# Patient Record
Sex: FEMALE | Race: WHITE | NOT HISPANIC OR LATINO | Employment: FULL TIME | URBAN - NONMETROPOLITAN AREA
[De-identification: names, ages, dates, MRNs, and addresses within clinical notes are randomized per-mention and may not be internally consistent; named-entity substitution may affect disease eponyms.]

---

## 2021-07-21 ENCOUNTER — OFFICE VISIT (OUTPATIENT)
Dept: URGENT CARE | Facility: PHYSICIAN GROUP | Age: 46
End: 2021-07-21
Payer: MEDICAID

## 2021-07-21 VITALS
DIASTOLIC BLOOD PRESSURE: 82 MMHG | BODY MASS INDEX: 22.15 KG/M2 | TEMPERATURE: 98.9 F | OXYGEN SATURATION: 98 % | HEART RATE: 84 BPM | SYSTOLIC BLOOD PRESSURE: 138 MMHG | WEIGHT: 125 LBS | HEIGHT: 63 IN | RESPIRATION RATE: 16 BRPM

## 2021-07-21 DIAGNOSIS — R07.9 CHEST PAIN, UNSPECIFIED TYPE: ICD-10-CM

## 2021-07-21 DIAGNOSIS — M54.12 CERVICAL RADICULOPATHY: ICD-10-CM

## 2021-07-21 PROCEDURE — 99204 OFFICE O/P NEW MOD 45 MIN: CPT | Performed by: PHYSICIAN ASSISTANT

## 2021-07-21 RX ORDER — METHYLPREDNISOLONE 4 MG/1
4 TABLET ORAL SEE ADMIN INSTRUCTIONS
Qty: 21 TABLET | Refills: 0 | Status: SHIPPED | OUTPATIENT
Start: 2021-07-21 | End: 2023-03-08

## 2021-07-21 RX ORDER — CLINDAMYCIN HYDROCHLORIDE 150 MG/1
CAPSULE ORAL
COMMUNITY
Start: 2021-07-12 | End: 2023-03-08

## 2021-07-21 NOTE — PROGRESS NOTES
"Chief Complaint   Patient presents with   • Arm Pain     x LT arm pain, chest pain        HISTORY OF PRESENT ILLNESS: Patient is a 46 y.o. female who presents today because she has a several week history of left arm numbness and tingling, also tingling and pain to the left side of her neck.  She sometimes has pain that radiates across her chest from left to right.  She does have a significant history of cervical degenerative disc disease as well as lumbar degenerative disc disease.  She has been taking Tylenol and ibuprofen without improvement.    There are no problems to display for this patient.      Allergies:Patient has no known allergies.    Current Outpatient Medications Ordered in Epic   Medication Sig Dispense Refill   • clindamycin (CLEOCIN) 150 MG Cap      • methylPREDNISolone (MEDROL DOSEPAK) 4 MG Tablet Therapy Pack Take 1 tablet by mouth see administration instructions. 21 tablet 0     No current Epic-ordered facility-administered medications on file.       History reviewed. No pertinent past medical history.    Social History     Tobacco Use   • Smoking status: Not on file   Substance Use Topics   • Alcohol use: Not on file   • Drug use: Not on file       No family status information on file.   History reviewed. No pertinent family history.    ROS:  Review of Systems   Constitutional: Negative for fever, chills, weight loss and malaise/fatigue.   HENT: Negative for ear pain, nosebleeds, congestion, sore throat and positive for neck pain.    Eyes: Negative for blurred vision.   Respiratory: Negative for cough, sputum production, shortness of breath and wheezing.    Cardiovascular: Positive for vague chest pain, no palpitations, orthopnea and leg swelling.   Gastrointestinal: Negative for heartburn, nausea, vomiting and abdominal pain.   Genitourinary: Negative for dysuria, urgency and frequency.     Exam:  /82   Pulse 84   Temp 37.2 °C (98.9 °F) (Temporal)   Resp 16   Ht 1.6 m (5' 3\")   Wt " 56.7 kg (125 lb)   SpO2 98%   General:  Well nourished, well developed female in NAD  Head:Normocephalic, atraumatic  Eyes: PERRLA, EOM within normal limits, no conjunctival injection, no scleral icterus, visual fields and acuity grossly intact.  Nose: Symmetrical without tenderness, no discharge.  Mouth: reasonable hygiene, no erythema exudates or tonsillar enlargement.  Neck: no masses, range of motion within normal limits, no tracheal deviation. No obvious thyroid enlargement.  Pulmonary: chest is symmetrical with respiration, no wheezes, crackles, or rhonchi.  Cardiovascular: regular rate and rhythm without murmurs, rubs, or gallops.  Extremities: no clubbing, cyanosis, or edema.    EKG by my interpretation in the office shows a normal sinus rhythm with a rate of 72.  There is no ST elevation, depression, no ectopy, no signs of ischemia or infarct.    Please note that this dictation was created using voice recognition software. I have made every reasonable attempt to correct obvious errors, but I expect that there are errors of grammar and possibly content that I did not discover before finalizing the note.    Assessment/Plan:  1. Chest pain, unspecified type  EKG   2. Cervical radiculopathy  methylPREDNISolone (MEDROL DOSEPAK) 4 MG Tablet Therapy Pack       Followup with primary care in the next 7-10 days if not significantly improving, return to the urgent care or go to the emergency room sooner for any worsening of symptoms.

## 2022-01-27 ENCOUNTER — HOSPITAL ENCOUNTER (OUTPATIENT)
Facility: MEDICAL CENTER | Age: 47
End: 2022-01-27
Attending: NURSE PRACTITIONER
Payer: COMMERCIAL

## 2022-01-27 ENCOUNTER — OFFICE VISIT (OUTPATIENT)
Dept: URGENT CARE | Facility: PHYSICIAN GROUP | Age: 47
End: 2022-01-27
Payer: COMMERCIAL

## 2022-01-27 VITALS
BODY MASS INDEX: 21.71 KG/M2 | HEIGHT: 62 IN | HEART RATE: 74 BPM | SYSTOLIC BLOOD PRESSURE: 110 MMHG | OXYGEN SATURATION: 99 % | WEIGHT: 118 LBS | RESPIRATION RATE: 12 BRPM | TEMPERATURE: 97.6 F | DIASTOLIC BLOOD PRESSURE: 68 MMHG

## 2022-01-27 DIAGNOSIS — J06.9 URI WITH COUGH AND CONGESTION: ICD-10-CM

## 2022-01-27 PROCEDURE — U0003 INFECTIOUS AGENT DETECTION BY NUCLEIC ACID (DNA OR RNA); SEVERE ACUTE RESPIRATORY SYNDROME CORONAVIRUS 2 (SARS-COV-2) (CORONAVIRUS DISEASE [COVID-19]), AMPLIFIED PROBE TECHNIQUE, MAKING USE OF HIGH THROUGHPUT TECHNOLOGIES AS DESCRIBED BY CMS-2020-01-R: HCPCS

## 2022-01-27 PROCEDURE — 99213 OFFICE O/P EST LOW 20 MIN: CPT | Performed by: NURSE PRACTITIONER

## 2022-01-27 PROCEDURE — U0005 INFEC AGEN DETEC AMPLI PROBE: HCPCS

## 2022-01-27 RX ORDER — GABAPENTIN 300 MG/1
300 CAPSULE ORAL
COMMUNITY
Start: 2022-01-25

## 2022-01-27 RX ORDER — METAXALONE 800 MG/1
800 TABLET ORAL 3 TIMES DAILY
COMMUNITY
Start: 2022-01-25 | End: 2023-09-13

## 2022-01-27 RX ORDER — ESCITALOPRAM OXALATE 20 MG/1
20 TABLET ORAL DAILY
COMMUNITY
Start: 2022-01-13

## 2022-01-27 ASSESSMENT — ENCOUNTER SYMPTOMS
SORE THROAT: 1
WHEEZING: 0
MYALGIAS: 1
FEVER: 1
CHILLS: 1
SHORTNESS OF BREATH: 0
HEMOPTYSIS: 0
SPUTUM PRODUCTION: 0
ORTHOPNEA: 0
PALPITATIONS: 0
COUGH: 1
SINUS PAIN: 0

## 2022-01-27 NOTE — LETTER
January 27, 2022         Patient: Nesha Mart   YOB: 1975   Date of Visit: 1/27/2022           A concern for Covid-19 has been identified and testing is in progress.  We are asking you practice self-isolation protocol per Center for Disease Control (CDC) guidelines.  You will be able to access test results through our electronic delivery system called [employer].    If the results of the testing are negative, and once there has been no fever (temperature >100.4 F) for at least 72 hours without treatment, and no vomiting or diarrhea for at least 48 hours, then return to work is approved.     If the results of testing are positive then your employee will be contacted by the [facility] or [facility] department for further instructions on duration of self-isolation and return to work or school protocol.  In general, this will also follow the CDC guidelines with a minimum of 5 days from the onset of symptoms and without fever, vomiting, or diarrhea as above.    In general, repeat testing is not necessary and not offered through our [facility].    This is the only note that will be provided from [facility] for this visit.  Your employee will require an appointment with a primary care provider if FMLA or disability forms are required.             Sincerely,           THIEN Blake.  Electronically Signed

## 2022-01-28 DIAGNOSIS — J06.9 URI WITH COUGH AND CONGESTION: ICD-10-CM

## 2022-01-28 LAB
COVID ORDER STATUS COVID19: NORMAL
SARS-COV-2 RNA RESP QL NAA+PROBE: NOTDETECTED
SPECIMEN SOURCE: NORMAL

## 2022-01-28 NOTE — PROGRESS NOTES
"Subjective     Nesha Mart is a 46 y.o. female who presents with Fever (sore throat,  x3 days), Congestion (work note needed), and Body Aches            Nesha comes in today with a 3 day history of fever, chills, myalgias, nasal congestion, sore throat and cough.  No known exposure to covid.        Review of Systems   Constitutional: Positive for chills, fever and malaise/fatigue.   HENT: Positive for congestion and sore throat. Negative for ear pain and sinus pain.    Respiratory: Positive for cough. Negative for hemoptysis, sputum production, shortness of breath and wheezing.    Cardiovascular: Negative for chest pain, palpitations, orthopnea and leg swelling.   Musculoskeletal: Positive for myalgias.     Medications, Allergies, and current problem list reviewed today in Epic         Objective     Blood Pressure 110/68   Pulse 74   Temperature 36.4 °C (97.6 °F)   Respiration 12   Height 1.575 m (5' 2\")   Weight 53.5 kg (118 lb)   Oxygen Saturation 99%   Body Mass Index 21.58 kg/m²      Physical Exam  Vitals reviewed.   Constitutional:       General: She is not in acute distress.     Appearance: Normal appearance. She is well-developed. She is not ill-appearing, toxic-appearing or diaphoretic.      Comments: Nesha appears fatigued.     HENT:      Head: Normocephalic.      Right Ear: Tympanic membrane, ear canal and external ear normal. There is no impacted cerumen.      Left Ear: Tympanic membrane, ear canal and external ear normal. There is no impacted cerumen.      Nose: Congestion and rhinorrhea present.      Mouth/Throat:      Mouth: Mucous membranes are moist.      Pharynx: Posterior oropharyngeal erythema present. No oropharyngeal exudate.   Eyes:      General: No scleral icterus.        Right eye: No discharge.         Left eye: No discharge.      Conjunctiva/sclera: Conjunctivae normal.      Pupils: Pupils are equal, round, and reactive to light.   Neck:      Thyroid: No thyromegaly.      Vascular: " No JVD.      Trachea: No tracheal deviation.   Cardiovascular:      Rate and Rhythm: Normal rate and regular rhythm.      Heart sounds: Normal heart sounds. No murmur heard.  No friction rub. No gallop.    Pulmonary:      Effort: Pulmonary effort is normal. No respiratory distress.      Breath sounds: Normal breath sounds. No stridor. No wheezing, rhonchi or rales.   Chest:      Chest wall: No tenderness.   Musculoskeletal:      Cervical back: Neck supple.   Lymphadenopathy:      Cervical: No cervical adenopathy.   Skin:     General: Skin is warm and dry.      Findings: No erythema or rash.   Neurological:      Mental Status: She is alert and oriented to person, place, and time.   Psychiatric:         Mood and Affect: Mood normal.                             Assessment & Plan        1. URI with cough and congestion  - SARS-CoV-2, PCR (In-House); Future    Advised Nesha that based on the history and exam findings, this is likely a self-limiting viral illness.  There is no indication for antibiotics at this time.  Differential reviewed.  At home isolation recommended pending covid test results.  OTC cold medications prn symptom management.  OTC NSAIDs or tylenol prn fever, pain.  Maintain adequate po hydration.  RTC in 5-7 days if symptoms persist, sooner if worse.  ED precautions reviewed.  She verbalized understanding of and agreed with plan of care.

## 2022-09-20 ENCOUNTER — HOSPITAL ENCOUNTER (OUTPATIENT)
Dept: LAB | Facility: MEDICAL CENTER | Age: 47
End: 2022-09-20
Attending: FAMILY MEDICINE
Payer: COMMERCIAL

## 2022-09-20 LAB
25(OH)D3 SERPL-MCNC: 38 NG/ML (ref 30–100)
ALBUMIN SERPL BCP-MCNC: 4.3 G/DL (ref 3.2–4.9)
ALBUMIN/GLOB SERPL: 1.5 G/DL
ALP SERPL-CCNC: 55 U/L (ref 30–99)
ALT SERPL-CCNC: 33 U/L (ref 2–50)
ANION GAP SERPL CALC-SCNC: 8 MMOL/L (ref 7–16)
AST SERPL-CCNC: 30 U/L (ref 12–45)
BILIRUB SERPL-MCNC: 0.2 MG/DL (ref 0.1–1.5)
BUN SERPL-MCNC: 21 MG/DL (ref 8–22)
CALCIUM SERPL-MCNC: 9.6 MG/DL (ref 8.5–10.5)
CHLORIDE SERPL-SCNC: 104 MMOL/L (ref 96–112)
CHOLEST SERPL-MCNC: 157 MG/DL (ref 100–199)
CO2 SERPL-SCNC: 28 MMOL/L (ref 20–33)
CREAT SERPL-MCNC: 0.58 MG/DL (ref 0.5–1.4)
ERYTHROCYTE [DISTWIDTH] IN BLOOD BY AUTOMATED COUNT: 39.9 FL (ref 35.9–50)
FASTING STATUS PATIENT QL REPORTED: NORMAL
GFR SERPLBLD CREATININE-BSD FMLA CKD-EPI: 112 ML/MIN/1.73 M 2
GLOBULIN SER CALC-MCNC: 2.9 G/DL (ref 1.9–3.5)
GLUCOSE SERPL-MCNC: 100 MG/DL (ref 65–99)
HCT VFR BLD AUTO: 39.2 % (ref 37–47)
HDLC SERPL-MCNC: 49 MG/DL
HGB BLD-MCNC: 13.2 G/DL (ref 12–16)
LDLC SERPL CALC-MCNC: 93 MG/DL
MCH RBC QN AUTO: 31.1 PG (ref 27–33)
MCHC RBC AUTO-ENTMCNC: 33.7 G/DL (ref 33.6–35)
MCV RBC AUTO: 92.2 FL (ref 81.4–97.8)
PLATELET # BLD AUTO: 357 K/UL (ref 164–446)
PMV BLD AUTO: 9.6 FL (ref 9–12.9)
POTASSIUM SERPL-SCNC: 4.6 MMOL/L (ref 3.6–5.5)
PROT SERPL-MCNC: 7.2 G/DL (ref 6–8.2)
RBC # BLD AUTO: 4.25 M/UL (ref 4.2–5.4)
SODIUM SERPL-SCNC: 140 MMOL/L (ref 135–145)
T4 FREE SERPL-MCNC: 1.53 NG/DL (ref 0.93–1.7)
TRIGL SERPL-MCNC: 76 MG/DL (ref 0–149)
TSH SERPL DL<=0.005 MIU/L-ACNC: <0.005 UIU/ML (ref 0.38–5.33)
VIT B12 SERPL-MCNC: 824 PG/ML (ref 211–911)
WBC # BLD AUTO: 5.1 K/UL (ref 4.8–10.8)

## 2022-09-20 PROCEDURE — 36415 COLL VENOUS BLD VENIPUNCTURE: CPT

## 2022-09-20 PROCEDURE — 82607 VITAMIN B-12: CPT

## 2022-09-20 PROCEDURE — 85027 COMPLETE CBC AUTOMATED: CPT

## 2022-09-20 PROCEDURE — 84439 ASSAY OF FREE THYROXINE: CPT

## 2022-09-20 PROCEDURE — 84443 ASSAY THYROID STIM HORMONE: CPT

## 2022-09-20 PROCEDURE — 82306 VITAMIN D 25 HYDROXY: CPT

## 2022-09-20 PROCEDURE — 83735 ASSAY OF MAGNESIUM: CPT

## 2022-09-20 PROCEDURE — 80061 LIPID PANEL: CPT

## 2022-09-20 PROCEDURE — 80053 COMPREHEN METABOLIC PANEL: CPT

## 2022-09-24 LAB — MAGNESIUM RBC-SCNC: 6.4 MG/DL (ref 3.6–7.5)

## 2023-01-04 ENCOUNTER — HOSPITAL ENCOUNTER (OUTPATIENT)
Dept: LAB | Facility: MEDICAL CENTER | Age: 48
End: 2023-01-04
Attending: OBSTETRICS & GYNECOLOGY
Payer: COMMERCIAL

## 2023-01-04 LAB
25(OH)D3 SERPL-MCNC: 37 NG/ML (ref 30–100)
ANION GAP SERPL CALC-SCNC: 8 MMOL/L (ref 7–16)
BUN SERPL-MCNC: 18 MG/DL (ref 8–22)
CALCIUM SERPL-MCNC: 9.4 MG/DL (ref 8.5–10.5)
CHLORIDE SERPL-SCNC: 107 MMOL/L (ref 96–112)
CO2 SERPL-SCNC: 25 MMOL/L (ref 20–33)
CREAT SERPL-MCNC: 0.54 MG/DL (ref 0.5–1.4)
EST. AVERAGE GLUCOSE BLD GHB EST-MCNC: 105 MG/DL
GFR SERPLBLD CREATININE-BSD FMLA CKD-EPI: 114 ML/MIN/1.73 M 2
GLUCOSE SERPL-MCNC: 92 MG/DL (ref 65–99)
HBA1C MFR BLD: 5.3 % (ref 4–5.6)
POTASSIUM SERPL-SCNC: 4.2 MMOL/L (ref 3.6–5.5)
SODIUM SERPL-SCNC: 140 MMOL/L (ref 135–145)
T3FREE SERPL-MCNC: 12.3 PG/ML (ref 2–4.4)
T4 FREE SERPL-MCNC: 2.83 NG/DL (ref 0.93–1.7)
THYROPEROXIDASE AB SERPL-ACNC: >600 IU/ML (ref 0–9)
TSH SERPL DL<=0.005 MIU/L-ACNC: <0.005 UIU/ML (ref 0.38–5.33)

## 2023-01-04 PROCEDURE — 83525 ASSAY OF INSULIN: CPT

## 2023-01-04 PROCEDURE — 86800 THYROGLOBULIN ANTIBODY: CPT

## 2023-01-04 PROCEDURE — 84481 FREE ASSAY (FT-3): CPT

## 2023-01-04 PROCEDURE — 82306 VITAMIN D 25 HYDROXY: CPT

## 2023-01-04 PROCEDURE — 84439 ASSAY OF FREE THYROXINE: CPT

## 2023-01-04 PROCEDURE — 80048 BASIC METABOLIC PNL TOTAL CA: CPT

## 2023-01-04 PROCEDURE — 86376 MICROSOMAL ANTIBODY EACH: CPT

## 2023-01-04 PROCEDURE — 36415 COLL VENOUS BLD VENIPUNCTURE: CPT

## 2023-01-04 PROCEDURE — 83036 HEMOGLOBIN GLYCOSYLATED A1C: CPT

## 2023-01-04 PROCEDURE — 84443 ASSAY THYROID STIM HORMONE: CPT

## 2023-01-07 LAB
INSULIN P FAST SERPL-ACNC: 6 UIU/ML (ref 3–25)
THYROGLOB AB SERPL-ACNC: 1.5 IU/ML (ref 0–4)

## 2023-03-08 ENCOUNTER — OFFICE VISIT (OUTPATIENT)
Dept: URGENT CARE | Facility: PHYSICIAN GROUP | Age: 48
End: 2023-03-08
Payer: COMMERCIAL

## 2023-03-08 VITALS
DIASTOLIC BLOOD PRESSURE: 78 MMHG | BODY MASS INDEX: 21.62 KG/M2 | WEIGHT: 122 LBS | HEIGHT: 63 IN | HEART RATE: 168 BPM | RESPIRATION RATE: 16 BRPM | TEMPERATURE: 98.5 F | OXYGEN SATURATION: 97 % | SYSTOLIC BLOOD PRESSURE: 118 MMHG

## 2023-03-08 DIAGNOSIS — R00.0 TACHYCARDIA: ICD-10-CM

## 2023-03-08 DIAGNOSIS — R11.2 NAUSEA AND VOMITING, UNSPECIFIED VOMITING TYPE: ICD-10-CM

## 2023-03-08 DIAGNOSIS — T50.905A MEDICATION REACTION, INITIAL ENCOUNTER: ICD-10-CM

## 2023-03-08 PROCEDURE — 93000 ELECTROCARDIOGRAM COMPLETE: CPT | Performed by: FAMILY MEDICINE

## 2023-03-08 PROCEDURE — 99214 OFFICE O/P EST MOD 30 MIN: CPT | Performed by: FAMILY MEDICINE

## 2023-03-08 RX ORDER — ONDANSETRON 4 MG/1
4 TABLET, ORALLY DISINTEGRATING ORAL ONCE
Status: COMPLETED | OUTPATIENT
Start: 2023-03-08 | End: 2023-03-08

## 2023-03-08 RX ORDER — ONDANSETRON 4 MG/1
4 TABLET, ORALLY DISINTEGRATING ORAL EVERY 8 HOURS PRN
Qty: 6 TABLET | Refills: 0 | Status: SHIPPED | OUTPATIENT
Start: 2023-03-08

## 2023-03-08 RX ORDER — METOPROLOL SUCCINATE 25 MG/1
12.5 TABLET, EXTENDED RELEASE ORAL 2 TIMES DAILY
Status: ON HOLD | COMMUNITY
Start: 2023-01-17 | End: 2023-03-11

## 2023-03-08 RX ORDER — CYCLOBENZAPRINE HCL 5 MG
5 TABLET ORAL 3 TIMES DAILY PRN
COMMUNITY
Start: 2023-01-09 | End: 2023-03-08

## 2023-03-08 RX ORDER — CYCLOBENZAPRINE HCL 10 MG
10 TABLET ORAL 3 TIMES DAILY
COMMUNITY
Start: 2023-02-12

## 2023-03-08 RX ORDER — DIPHENHYDRAMINE HCL 25 MG
25 CAPSULE ORAL ONCE
Status: COMPLETED | OUTPATIENT
Start: 2023-03-08 | End: 2023-03-08

## 2023-03-08 RX ORDER — PREGABALIN 50 MG/1
50 CAPSULE ORAL DAILY
Status: ON HOLD | COMMUNITY
Start: 2023-03-07 | End: 2023-03-11

## 2023-03-08 RX ORDER — MELOXICAM 15 MG/1
15 TABLET ORAL DAILY
COMMUNITY
Start: 2023-02-20

## 2023-03-08 RX ADMIN — Medication 25 MG: at 18:57

## 2023-03-08 RX ADMIN — ONDANSETRON 4 MG: 4 TABLET, ORALLY DISINTEGRATING ORAL at 18:50

## 2023-03-08 ASSESSMENT — ENCOUNTER SYMPTOMS
EYE DISCHARGE: 0
EYE REDNESS: 0
WEIGHT LOSS: 0
DIARRHEA: 0
MYALGIAS: 0

## 2023-03-08 ASSESSMENT — FIBROSIS 4 INDEX: FIB4 SCORE: 0.7

## 2023-03-08 NOTE — LETTER
March 8, 2023         Patient: Nesha Mart   YOB: 1975   Date of Visit: 3/8/2023           To Whom it May Concern:    Nesha Mart was seen in my clinic on 3/8/2023. Please excuse from work 3/8 and 3/9/2023.       Sincerely,           Guy Orozco M.D.  Electronically Signed

## 2023-03-09 NOTE — PROGRESS NOTES
"Subjective     Nesha Mart is a 48 y.o. female who presents with Shaking, Emesis, and Tachycardia            Onset this morning 3 episodes of nausea and vomiting.  Thinks due to starting Lyrica.  She is recently stopped her gabapentin.  She takes these medications for pain management.  No blood in emesis.  She feels shaky and somewhat anxious.  Heart feels like it is racing.  No chest pain.  No shortness of breath.  Normal urine output. No other aggravating or alleviating factors.      Review of Systems   Constitutional:  Negative for malaise/fatigue and weight loss.   Eyes:  Negative for discharge and redness.   Cardiovascular:  Negative for leg swelling.   Gastrointestinal:  Negative for diarrhea.   Musculoskeletal:  Negative for joint pain and myalgias.   Skin:  Negative for itching and rash.            Objective     /78   Pulse (!) 168   Temp 36.9 °C (98.5 °F) (Temporal)   Resp 16   Ht 1.6 m (5' 3\")   Wt 55.3 kg (122 lb)   SpO2 97%   BMI 21.61 kg/m²      Physical Exam  Constitutional:       General: She is not in acute distress.     Appearance: She is well-developed.   HENT:      Head: Normocephalic and atraumatic.      Mouth/Throat:      Mouth: Mucous membranes are moist.      Pharynx: No posterior oropharyngeal erythema.   Eyes:      Conjunctiva/sclera: Conjunctivae normal.   Cardiovascular:      Rate and Rhythm: Regular rhythm. Tachycardia present.      Heart sounds: Normal heart sounds. No murmur heard.     Comments: Recheck HR at 19:10 120.   Pulmonary:      Effort: Pulmonary effort is normal.      Breath sounds: Normal breath sounds. No wheezing.   Skin:     General: Skin is warm and dry.      Findings: No rash.   Neurological:      Mental Status: She is alert.                           Assessment & Plan   Ekg: sinus tachycardia 128, baseline wander, nonspecific t wave changes.        1. Nausea and vomiting, unspecified vomiting type  ondansetron (ZOFRAN ODT) dispertab 4 mg    ondansetron " (ZOFRAN ODT) 4 MG TABLET DISPERSIBLE      2. Tachycardia  EKG - Clinic Performed      3. Medication reaction, initial encounter  diphenhydrAMINE (BENADRYL) capsule 25 mg           Differential diagnosis, natural history, supportive care, and indications for immediate follow-up were discussed.     Possible medication reaction. Stop lyrica.      Nesha has a heart rate monitor watch at home. If pulse remains over 100 this evening I recommend to ER for further evaluation.

## 2023-03-10 ENCOUNTER — HOSPITAL ENCOUNTER (OUTPATIENT)
Facility: MEDICAL CENTER | Age: 48
End: 2023-03-11
Attending: EMERGENCY MEDICINE | Admitting: INTERNAL MEDICINE
Payer: COMMERCIAL

## 2023-03-10 ENCOUNTER — APPOINTMENT (OUTPATIENT)
Dept: CARDIOLOGY | Facility: MEDICAL CENTER | Age: 48
End: 2023-03-10
Attending: INTERNAL MEDICINE
Payer: COMMERCIAL

## 2023-03-10 ENCOUNTER — APPOINTMENT (OUTPATIENT)
Dept: RADIOLOGY | Facility: MEDICAL CENTER | Age: 48
End: 2023-03-10
Attending: INTERNAL MEDICINE
Payer: COMMERCIAL

## 2023-03-10 DIAGNOSIS — R00.2 PALPITATIONS: ICD-10-CM

## 2023-03-10 DIAGNOSIS — R00.0 TACHYCARDIA: ICD-10-CM

## 2023-03-10 DIAGNOSIS — E05.90 HYPERTHYROIDISM: ICD-10-CM

## 2023-03-10 PROBLEM — M54.9 CHRONIC BACK PAIN: Status: ACTIVE | Noted: 2023-03-10

## 2023-03-10 PROBLEM — G89.29 CHRONIC BACK PAIN: Status: ACTIVE | Noted: 2023-03-10

## 2023-03-10 LAB
ALBUMIN SERPL BCP-MCNC: 3.8 G/DL (ref 3.2–4.9)
ALBUMIN/GLOB SERPL: 1.1 G/DL
ALP SERPL-CCNC: 98 U/L (ref 30–99)
ALT SERPL-CCNC: 46 U/L (ref 2–50)
AMPHET UR QL SCN: NEGATIVE
ANION GAP SERPL CALC-SCNC: 12 MMOL/L (ref 7–16)
AST SERPL-CCNC: 35 U/L (ref 12–45)
BARBITURATES UR QL SCN: NEGATIVE
BASOPHILS # BLD AUTO: 0.3 % (ref 0–1.8)
BASOPHILS # BLD: 0.02 K/UL (ref 0–0.12)
BENZODIAZ UR QL SCN: NEGATIVE
BILIRUB SERPL-MCNC: 0.2 MG/DL (ref 0.1–1.5)
BUN SERPL-MCNC: 28 MG/DL (ref 8–22)
BZE UR QL SCN: NEGATIVE
CALCIUM ALBUM COR SERPL-MCNC: 10 MG/DL (ref 8.5–10.5)
CALCIUM SERPL-MCNC: 9.8 MG/DL (ref 8.5–10.5)
CANNABINOIDS UR QL SCN: NEGATIVE
CHLORIDE SERPL-SCNC: 104 MMOL/L (ref 96–112)
CO2 SERPL-SCNC: 22 MMOL/L (ref 20–33)
CREAT SERPL-MCNC: 0.69 MG/DL (ref 0.5–1.4)
EKG IMPRESSION: NORMAL
EOSINOPHIL # BLD AUTO: 0.06 K/UL (ref 0–0.51)
EOSINOPHIL NFR BLD: 0.9 % (ref 0–6.9)
ERYTHROCYTE [DISTWIDTH] IN BLOOD BY AUTOMATED COUNT: 37.7 FL (ref 35.9–50)
GFR SERPLBLD CREATININE-BSD FMLA CKD-EPI: 107 ML/MIN/1.73 M 2
GLOBULIN SER CALC-MCNC: 3.6 G/DL (ref 1.9–3.5)
GLUCOSE SERPL-MCNC: 94 MG/DL (ref 65–99)
HCT VFR BLD AUTO: 37 % (ref 37–47)
HGB BLD-MCNC: 12.5 G/DL (ref 12–16)
IMM GRANULOCYTES # BLD AUTO: 0.02 K/UL (ref 0–0.11)
IMM GRANULOCYTES NFR BLD AUTO: 0.3 % (ref 0–0.9)
LV EJECT FRACT  99904: 60
LV EJECT FRACT MOD 2C 99903: 63.68
LV EJECT FRACT MOD 4C 99902: 68.48
LV EJECT FRACT MOD BP 99901: 67.26
LYMPHOCYTES # BLD AUTO: 2.8 K/UL (ref 1–4.8)
LYMPHOCYTES NFR BLD: 41.9 % (ref 22–41)
MAGNESIUM SERPL-MCNC: 1.7 MG/DL (ref 1.5–2.5)
MCH RBC QN AUTO: 29.1 PG (ref 27–33)
MCHC RBC AUTO-ENTMCNC: 33.8 G/DL (ref 33.6–35)
MCV RBC AUTO: 86.2 FL (ref 81.4–97.8)
METHADONE UR QL SCN: NEGATIVE
MONOCYTES # BLD AUTO: 0.53 K/UL (ref 0–0.85)
MONOCYTES NFR BLD AUTO: 7.9 % (ref 0–13.4)
NEUTROPHILS # BLD AUTO: 3.26 K/UL (ref 2–7.15)
NEUTROPHILS NFR BLD: 48.7 % (ref 44–72)
NRBC # BLD AUTO: 0 K/UL
NRBC BLD-RTO: 0 /100 WBC
OPIATES UR QL SCN: NEGATIVE
OXYCODONE UR QL SCN: NEGATIVE
PCP UR QL SCN: NEGATIVE
PLATELET # BLD AUTO: 380 K/UL (ref 164–446)
PMV BLD AUTO: 9.1 FL (ref 9–12.9)
POTASSIUM SERPL-SCNC: 3.8 MMOL/L (ref 3.6–5.5)
PROPOXYPH UR QL SCN: NEGATIVE
PROT SERPL-MCNC: 7.4 G/DL (ref 6–8.2)
RBC # BLD AUTO: 4.29 M/UL (ref 4.2–5.4)
SODIUM SERPL-SCNC: 138 MMOL/L (ref 135–145)
T3 SERPL-MCNC: 375 NG/DL (ref 60–181)
T3FREE SERPL-MCNC: 15.1 PG/ML (ref 2–4.4)
T4 FREE SERPL-MCNC: 5.33 NG/DL (ref 0.93–1.7)
TSH SERPL DL<=0.005 MIU/L-ACNC: <0.005 UIU/ML (ref 0.38–5.33)
WBC # BLD AUTO: 6.7 K/UL (ref 4.8–10.8)

## 2023-03-10 PROCEDURE — G0378 HOSPITAL OBSERVATION PER HR: HCPCS

## 2023-03-10 PROCEDURE — 93005 ELECTROCARDIOGRAM TRACING: CPT

## 2023-03-10 PROCEDURE — 83735 ASSAY OF MAGNESIUM: CPT

## 2023-03-10 PROCEDURE — 80053 COMPREHEN METABOLIC PANEL: CPT

## 2023-03-10 PROCEDURE — 93005 ELECTROCARDIOGRAM TRACING: CPT | Performed by: EMERGENCY MEDICINE

## 2023-03-10 PROCEDURE — 84480 ASSAY TRIIODOTHYRONINE (T3): CPT

## 2023-03-10 PROCEDURE — 84443 ASSAY THYROID STIM HORMONE: CPT

## 2023-03-10 PROCEDURE — 84439 ASSAY OF FREE THYROXINE: CPT

## 2023-03-10 PROCEDURE — 99285 EMERGENCY DEPT VISIT HI MDM: CPT

## 2023-03-10 PROCEDURE — 76536 US EXAM OF HEAD AND NECK: CPT

## 2023-03-10 PROCEDURE — 93306 TTE W/DOPPLER COMPLETE: CPT | Mod: 26 | Performed by: INTERNAL MEDICINE

## 2023-03-10 PROCEDURE — A9270 NON-COVERED ITEM OR SERVICE: HCPCS | Performed by: INTERNAL MEDICINE

## 2023-03-10 PROCEDURE — 700105 HCHG RX REV CODE 258: Performed by: INTERNAL MEDICINE

## 2023-03-10 PROCEDURE — 99222 1ST HOSP IP/OBS MODERATE 55: CPT | Performed by: INTERNAL MEDICINE

## 2023-03-10 PROCEDURE — 700105 HCHG RX REV CODE 258: Performed by: EMERGENCY MEDICINE

## 2023-03-10 PROCEDURE — 700102 HCHG RX REV CODE 250 W/ 637 OVERRIDE(OP): Performed by: INTERNAL MEDICINE

## 2023-03-10 PROCEDURE — 85025 COMPLETE CBC W/AUTO DIFF WBC: CPT

## 2023-03-10 PROCEDURE — 93306 TTE W/DOPPLER COMPLETE: CPT

## 2023-03-10 PROCEDURE — 700111 HCHG RX REV CODE 636 W/ 250 OVERRIDE (IP): Performed by: INTERNAL MEDICINE

## 2023-03-10 PROCEDURE — 96374 THER/PROPH/DIAG INJ IV PUSH: CPT

## 2023-03-10 PROCEDURE — 84481 FREE ASSAY (FT-3): CPT

## 2023-03-10 PROCEDURE — 80307 DRUG TEST PRSMV CHEM ANLYZR: CPT

## 2023-03-10 PROCEDURE — 36415 COLL VENOUS BLD VENIPUNCTURE: CPT

## 2023-03-10 RX ORDER — SODIUM CHLORIDE, SODIUM LACTATE, POTASSIUM CHLORIDE, CALCIUM CHLORIDE 600; 310; 30; 20 MG/100ML; MG/100ML; MG/100ML; MG/100ML
INJECTION, SOLUTION INTRAVENOUS CONTINUOUS
Status: DISCONTINUED | OUTPATIENT
Start: 2023-03-10 | End: 2023-03-11 | Stop reason: HOSPADM

## 2023-03-10 RX ORDER — LABETALOL HYDROCHLORIDE 5 MG/ML
10 INJECTION, SOLUTION INTRAVENOUS EVERY 4 HOURS PRN
Status: DISCONTINUED | OUTPATIENT
Start: 2023-03-10 | End: 2023-03-11 | Stop reason: HOSPADM

## 2023-03-10 RX ORDER — PROMETHAZINE HYDROCHLORIDE 25 MG/1
12.5-25 SUPPOSITORY RECTAL EVERY 4 HOURS PRN
Status: DISCONTINUED | OUTPATIENT
Start: 2023-03-10 | End: 2023-03-11 | Stop reason: HOSPADM

## 2023-03-10 RX ORDER — ESCITALOPRAM OXALATE 10 MG/1
20 TABLET ORAL DAILY
Status: DISCONTINUED | OUTPATIENT
Start: 2023-03-10 | End: 2023-03-11 | Stop reason: HOSPADM

## 2023-03-10 RX ORDER — POLYETHYLENE GLYCOL 3350 17 G/17G
1 POWDER, FOR SOLUTION ORAL
Status: DISCONTINUED | OUTPATIENT
Start: 2023-03-10 | End: 2023-03-11 | Stop reason: HOSPADM

## 2023-03-10 RX ORDER — DOCUSATE SODIUM 100 MG/1
100 CAPSULE, LIQUID FILLED ORAL 2 TIMES DAILY
COMMUNITY
End: 2024-03-11

## 2023-03-10 RX ORDER — SODIUM CHLORIDE 9 MG/ML
1000 INJECTION, SOLUTION INTRAVENOUS ONCE
Status: COMPLETED | OUTPATIENT
Start: 2023-03-10 | End: 2023-03-10

## 2023-03-10 RX ORDER — ONDANSETRON 4 MG/1
4 TABLET, ORALLY DISINTEGRATING ORAL EVERY 4 HOURS PRN
Status: DISCONTINUED | OUTPATIENT
Start: 2023-03-10 | End: 2023-03-11 | Stop reason: HOSPADM

## 2023-03-10 RX ORDER — MELOXICAM 7.5 MG/1
15 TABLET ORAL DAILY
Status: DISCONTINUED | OUTPATIENT
Start: 2023-03-10 | End: 2023-03-11 | Stop reason: HOSPADM

## 2023-03-10 RX ORDER — PROCHLORPERAZINE EDISYLATE 5 MG/ML
5-10 INJECTION INTRAMUSCULAR; INTRAVENOUS EVERY 4 HOURS PRN
Status: DISCONTINUED | OUTPATIENT
Start: 2023-03-10 | End: 2023-03-11 | Stop reason: HOSPADM

## 2023-03-10 RX ORDER — PROMETHAZINE HYDROCHLORIDE 25 MG/1
12.5-25 TABLET ORAL EVERY 4 HOURS PRN
Status: DISCONTINUED | OUTPATIENT
Start: 2023-03-10 | End: 2023-03-11 | Stop reason: HOSPADM

## 2023-03-10 RX ORDER — ATENOLOL 25 MG/1
50 TABLET ORAL
Status: DISCONTINUED | OUTPATIENT
Start: 2023-03-10 | End: 2023-03-11 | Stop reason: HOSPADM

## 2023-03-10 RX ORDER — CYCLOBENZAPRINE HCL 5 MG
10 TABLET ORAL 3 TIMES DAILY
Status: DISCONTINUED | OUTPATIENT
Start: 2023-03-10 | End: 2023-03-11 | Stop reason: HOSPADM

## 2023-03-10 RX ORDER — ONDANSETRON 2 MG/ML
4 INJECTION INTRAMUSCULAR; INTRAVENOUS EVERY 4 HOURS PRN
Status: DISCONTINUED | OUTPATIENT
Start: 2023-03-10 | End: 2023-03-11 | Stop reason: HOSPADM

## 2023-03-10 RX ORDER — BISACODYL 10 MG
10 SUPPOSITORY, RECTAL RECTAL
Status: DISCONTINUED | OUTPATIENT
Start: 2023-03-10 | End: 2023-03-11 | Stop reason: HOSPADM

## 2023-03-10 RX ORDER — AMOXICILLIN 250 MG
2 CAPSULE ORAL 2 TIMES DAILY
Status: DISCONTINUED | OUTPATIENT
Start: 2023-03-10 | End: 2023-03-11 | Stop reason: HOSPADM

## 2023-03-10 RX ORDER — METHIMAZOLE 10 MG/1
10 TABLET ORAL 2 TIMES DAILY
Status: DISCONTINUED | OUTPATIENT
Start: 2023-03-10 | End: 2023-03-11 | Stop reason: HOSPADM

## 2023-03-10 RX ORDER — METAXALONE 800 MG/1
800 TABLET ORAL 3 TIMES DAILY
Status: DISCONTINUED | OUTPATIENT
Start: 2023-03-10 | End: 2023-03-11 | Stop reason: HOSPADM

## 2023-03-10 RX ORDER — ACETAMINOPHEN 325 MG/1
650 TABLET ORAL EVERY 6 HOURS PRN
Status: DISCONTINUED | OUTPATIENT
Start: 2023-03-10 | End: 2023-03-11 | Stop reason: HOSPADM

## 2023-03-10 RX ADMIN — CYCLOBENZAPRINE HYDROCHLORIDE 10 MG: 5 TABLET, FILM COATED ORAL at 20:05

## 2023-03-10 RX ADMIN — SODIUM CHLORIDE 1000 ML: 9 INJECTION, SOLUTION INTRAVENOUS at 09:29

## 2023-03-10 RX ADMIN — ONDANSETRON 4 MG: 2 INJECTION INTRAMUSCULAR; INTRAVENOUS at 23:49

## 2023-03-10 RX ADMIN — METHIMAZOLE 10 MG: 10 TABLET ORAL at 17:54

## 2023-03-10 RX ADMIN — SENNOSIDES AND DOCUSATE SODIUM 2 TABLET: 50; 8.6 TABLET ORAL at 17:54

## 2023-03-10 RX ADMIN — SODIUM CHLORIDE, POTASSIUM CHLORIDE, SODIUM LACTATE AND CALCIUM CHLORIDE: 600; 310; 30; 20 INJECTION, SOLUTION INTRAVENOUS at 20:20

## 2023-03-10 RX ADMIN — METAXALONE 800 MG: 800 TABLET ORAL at 20:06

## 2023-03-10 RX ADMIN — SODIUM CHLORIDE, POTASSIUM CHLORIDE, SODIUM LACTATE AND CALCIUM CHLORIDE: 600; 310; 30; 20 INJECTION, SOLUTION INTRAVENOUS at 12:36

## 2023-03-10 RX ADMIN — ESCITALOPRAM OXALATE 20 MG: 10 TABLET ORAL at 12:34

## 2023-03-10 RX ADMIN — SODIUM CHLORIDE 1000 ML: 9 INJECTION, SOLUTION INTRAVENOUS at 11:18

## 2023-03-10 RX ADMIN — METAXALONE 800 MG: 800 TABLET ORAL at 15:15

## 2023-03-10 RX ADMIN — CHOLECALCIFEROL TAB 125 MCG (5000 UNIT) 5000 UNITS: 125 TAB at 13:49

## 2023-03-10 RX ADMIN — CYCLOBENZAPRINE HYDROCHLORIDE 10 MG: 5 TABLET, FILM COATED ORAL at 15:14

## 2023-03-10 RX ADMIN — ATENOLOL 50 MG: 25 TABLET ORAL at 16:40

## 2023-03-10 RX ADMIN — MELOXICAM 15 MG: 7.5 TABLET ORAL at 13:48

## 2023-03-10 ASSESSMENT — FIBROSIS 4 INDEX
FIB4 SCORE: 0.65
FIB4 SCORE: 0.65
FIB4 SCORE: 0.7

## 2023-03-10 ASSESSMENT — LIFESTYLE VARIABLES
ALCOHOL_USE: NO
EVER FELT BAD OR GUILTY ABOUT YOUR DRINKING: NO
TOTAL SCORE: 0
TOTAL SCORE: 0
EVER HAD A DRINK FIRST THING IN THE MORNING TO STEADY YOUR NERVES TO GET RID OF A HANGOVER: NO
HAVE YOU EVER FELT YOU SHOULD CUT DOWN ON YOUR DRINKING: NO
HOW MANY TIMES IN THE PAST YEAR HAVE YOU HAD 5 OR MORE DRINKS IN A DAY: 0
TOTAL SCORE: 0
ON A TYPICAL DAY WHEN YOU DRINK ALCOHOL HOW MANY DRINKS DO YOU HAVE: 0
AVERAGE NUMBER OF DAYS PER WEEK YOU HAVE A DRINK CONTAINING ALCOHOL: 0
HAVE PEOPLE ANNOYED YOU BY CRITICIZING YOUR DRINKING: NO
DOES PATIENT WANT TO STOP DRINKING: NO
CONSUMPTION TOTAL: NEGATIVE

## 2023-03-10 ASSESSMENT — PATIENT HEALTH QUESTIONNAIRE - PHQ9
SUM OF ALL RESPONSES TO PHQ9 QUESTIONS 1 AND 2: 0
2. FEELING DOWN, DEPRESSED, IRRITABLE, OR HOPELESS: NOT AT ALL
1. LITTLE INTEREST OR PLEASURE IN DOING THINGS: NOT AT ALL

## 2023-03-10 ASSESSMENT — PAIN DESCRIPTION - PAIN TYPE: TYPE: CHRONIC PAIN

## 2023-03-10 NOTE — ASSESSMENT & PLAN NOTE
- Sinus tachycardia.  Suspect this is related to Lyrica intolerance, however this is in setting of baseline hyperthyroidism.  -Lyrica will be stopped.  Hold off on restarting gabapentin, and deferred this outpatient pain management specialist.  -Management of hyperthyroidism as below.  -She may be dry as well, with elevated BUN.  Continue IV fluid rehydration, start LR at 125 cc/h.  -Monitor on telemetry.  Check baseline echocardiogram.

## 2023-03-10 NOTE — ASSESSMENT & PLAN NOTE
- She has been seeing an outpatient pain specialist.  Unfortunately, developed adverse reaction to Lyrica.  Hold off on Lyrica for now, and also hold off on gabapentin with deference to outpatient pain management to start alternative medications.  Resume home Flexeril, Lexapro, Mobic and Skelaxin.

## 2023-03-10 NOTE — ED PROVIDER NOTES
ER Provider Note    Scribed for John Lombardo M.d. by Latanya Da Silva. 3/10/2023  9:22 AM    Primary Care Provider: Pcp Pt States None    CHIEF COMPLAINT  Chief Complaint   Patient presents with    Medication Reaction     Pt started on Lyrica on Tuesday and gabapentin was stopped two days prior to that.  Pt went to urgent care on Wednesday and was instructed to stop taking the Lyrica.      Tachycardia     Pt has noticed elevated HR since Tuesday.  Pt currently on a beta blocker. Pt was instructed to come to the ED if her heart rate did not improve.       EXTERNAL RECORDS REVIEWED  Outpatient Notes Patient was seen at urgent care on 3/8 for similar symptoms and was advised to stop taking her lyrica as they suspected her symptoms were related to a medication reaction     HPI/ROS  LIMITATION TO HISTORY   Select: : None  OUTSIDE HISTORIAN(S):  none    Nesha Mart is a 48 y.o. female who presents to the ED complaining of tachycardia. Patient was started on lyrica 3 days ago. She was seen at urgent care the day after starting this due to sweating, vomiting, and palpitations. She was advised to stop taking the medication due to tachycardia and was told to follow up in the ED if her heart rate does not improve. Patient was previously taking gabapentin 300 mg and was taken off this to start on lyrica. She states she was gradually weaned off gabapentin and was off the medication for two days Additionally she takes flexeril and meloxicam. Patient takes a beta-blocker BID. Denies taking medication for thyroid. She drinks around 1 energy drink a day and coffee, but states she has not had any caffeine for 2-3 days. Endorses using nicotine, but quit smoking cigarettes. Denies drug use.    PAST MEDICAL HISTORY  History reviewed. No pertinent past medical history.    SURGICAL HISTORY  History reviewed. No pertinent surgical history.    FAMILY HISTORY  History reviewed. No pertinent family history.    SOCIAL HISTORY   reports  "that she has quit smoking. Her smoking use included cigarettes. She has never used smokeless tobacco. She reports that she does not currently use alcohol. She reports that she does not use drugs.    CURRENT MEDICATIONS  Previous Medications    CYCLOBENZAPRINE (FLEXERIL) 10 MG TAB    Take 10 mg by mouth every 12 hours.    ESCITALOPRAM (LEXAPRO) 20 MG TABLET    Take 20 mg by mouth every day.    GABAPENTIN (NEURONTIN) 300 MG CAP    Take 300 mg by mouth at bedtime.    MELOXICAM (MOBIC) 15 MG TABLET    Take 15 mg by mouth every 12 hours as needed.    METAXALONE (SKELAXIN) 800 MG TAB    Take 800 mg by mouth 3 times a day as needed.    METOPROLOL SR (TOPROL XL) 25 MG TABLET SR 24 HR    TAKE ONE-HALF TO ONE TABLET BY MOUTH DAILY TO CONTROL HEART RATE    ONDANSETRON (ZOFRAN ODT) 4 MG TABLET DISPERSIBLE    Take 1 Tablet by mouth every 8 hours as needed for Nausea/Vomiting.    PREGABALIN (LYRICA) 50 MG CAPSULE           ALLERGIES  Lyrica    PHYSICAL EXAM  /83   Pulse (!) 133   Temp 36.6 °C (97.8 °F) (Temporal)   Resp 20   Ht 1.6 m (5' 3\")   Wt 58.1 kg (128 lb 1.4 oz)   SpO2 98%   BMI 22.69 kg/m²   Nursing note and vitals reviewed.  Constitutional: Well-developed and well-nourished.  Somewhat anxious.  HENT: Head is normocephalic and atraumatic. Oropharynx is clear and moist without exudate or erythema.   Eyes: Pupils are equal, round, and reactive to light. Conjunctiva are normal.   Cardiovascular: Tachycardic, regular rhythm. No murmur heard. Normal radial pulses.  Pulmonary/Chest: Breath sounds normal. No wheezes or rales.   Abdominal: Soft and non-tender. No distention    Musculoskeletal: Extremities exhibit normal range of motion without edema or tenderness.   Neurological: Awake, alert and oriented to person, place, and time. Tremulous, No focal deficits noted.  Skin: Skin is warm and dry. No rash.   Psychiatric: Normal mood and affect. Appropriate for clinical situation    DIAGNOSTIC STUDIES    Labs: "   Results for orders placed or performed during the hospital encounter of 03/10/23   CBC WITH DIFFERENTIAL   Result Value Ref Range    WBC 6.7 4.8 - 10.8 K/uL    RBC 4.29 4.20 - 5.40 M/uL    Hemoglobin 12.5 12.0 - 16.0 g/dL    Hematocrit 37.0 37.0 - 47.0 %    MCV 86.2 81.4 - 97.8 fL    MCH 29.1 27.0 - 33.0 pg    MCHC 33.8 33.6 - 35.0 g/dL    RDW 37.7 35.9 - 50.0 fL    Platelet Count 380 164 - 446 K/uL    MPV 9.1 9.0 - 12.9 fL    Neutrophils-Polys 48.70 44.00 - 72.00 %    Lymphocytes 41.90 (H) 22.00 - 41.00 %    Monocytes 7.90 0.00 - 13.40 %    Eosinophils 0.90 0.00 - 6.90 %    Basophils 0.30 0.00 - 1.80 %    Immature Granulocytes 0.30 0.00 - 0.90 %    Nucleated RBC 0.00 /100 WBC    Neutrophils (Absolute) 3.26 2.00 - 7.15 K/uL    Lymphs (Absolute) 2.80 1.00 - 4.80 K/uL    Monos (Absolute) 0.53 0.00 - 0.85 K/uL    Eos (Absolute) 0.06 0.00 - 0.51 K/uL    Baso (Absolute) 0.02 0.00 - 0.12 K/uL    Immature Granulocytes (abs) 0.02 0.00 - 0.11 K/uL    NRBC (Absolute) 0.00 K/uL   COMP METABOLIC PANEL   Result Value Ref Range    Sodium 138 135 - 145 mmol/L    Potassium 3.8 3.6 - 5.5 mmol/L    Chloride 104 96 - 112 mmol/L    Co2 22 20 - 33 mmol/L    Anion Gap 12.0 7.0 - 16.0    Glucose 94 65 - 99 mg/dL    Bun 28 (H) 8 - 22 mg/dL    Creatinine 0.69 0.50 - 1.40 mg/dL    Calcium 9.8 8.5 - 10.5 mg/dL    AST(SGOT) 35 12 - 45 U/L    ALT(SGPT) 46 2 - 50 U/L    Alkaline Phosphatase 98 30 - 99 U/L    Total Bilirubin 0.2 0.1 - 1.5 mg/dL    Albumin 3.8 3.2 - 4.9 g/dL    Total Protein 7.4 6.0 - 8.2 g/dL    Globulin 3.6 (H) 1.9 - 3.5 g/dL    A-G Ratio 1.1 g/dL   FREE THYROXINE   Result Value Ref Range    Free T-4 5.33 (H) 0.93 - 1.70 ng/dL   TSH   Result Value Ref Range    TSH <0.005 (L) 0.380 - 5.330 uIU/mL   CORRECTED CALCIUM   Result Value Ref Range    Correct Calcium 10.0 8.5 - 10.5 mg/dL   ESTIMATED GFR   Result Value Ref Range    GFR (CKD-EPI) 107 >60 mL/min/1.73 m 2   EKG   Result Value Ref Range    Report       [facility] Emergency Dept.    Test Date:  2023-03-10  Pt Name:    YASH BARNES             Department: ER  MRN:        [id number]                      Room:  Gender:     Female                       Technician: [id number]  :        1975                   Requested By:ER TRIAGE PROTOCOL  Order #:    [id number]                    Reading MD: CHIO DHILLON MD    Measurements  Intervals                                Axis  Rate:       116                          P:          50  WI:         115                          QRS:        43  QRSD:       94                           T:          2  QT:         317  QTc:        441    Interpretive Statements  Sinus tachycardia  Consider right atrial enlargement  Baseline wander in lead(s) V5  No previous ECG available for comparison  Electronically Signed On 3- 9:22:33 PST by CHIO DHILLON MD          EKG:   I have independently interpreted this EKG as above    COURSE & MEDICAL DECISION MAKING     ED Observation Status? Yes; I am placing the patient in to an observation status due to a diagnostic uncertainty as well as therapeutic intensity. Patient placed in observation status at 9:27 AM, 3/10/2023.     Observation plan is as follows: laboratory studies    Upon Reevaluation, the patient's condition has: not improved; and will be escalated to hospitalization.    Patient discharged from ED Observation status at 11:00 AM (Time) 03/10/23 (Date).     INITIAL ASSESSMENT, COURSE AND PLAN  9:29 AM - Patient seen and examined at bedside. I informed the patient her symptoms appear consistent with withdrawal which is common when coming off gabapentin. We will obtain labs and treat her symptoms. Patient has a history of tyroid disorder and is not on medication therefore we will obtain labs to evaluate her thyroid. Patient agrees to the plan of care. The patient will be resuscitated with 2L NS IV. Ordered for CBC w/ diff, CMP, UDS, TSH, free thyroxine, and EKG to  evaluate her symptoms.      10:49 AM - patients heart rate is improved to 90 after IV fluids.     11:00 AM - Reviewed the patient's lab and imaging results. [place] hospitalist.    11:13 AM - I discussed the patient's case and the above findings with Dr. Stauffer (Hospitalist) who will consult the patient for admission. Patient care will be transferred at this time.    Care Narrative: Patient presents today with tachycardia.  Has significant hyperthyroidism.  Does not meet criteria for thyroid storm but she has persistently increasing free thyroxine levels.  Certainly seems to be symptomatic from this.  I feel that she warrants admission for initiation of appropriate therapy for hyperthyroidism.  Likely transition off of metoprolol to propranolol.    HYDRATION: Based on the patient's presentation of Tachycardia the patient was given IV fluids. IV Hydration was used because oral hydration was not adequate alone. Upon recheck following hydration, the patient was improved.    DISPOSITION AND DISCUSSIONS  I have discussed management of the patient with the following physicians and CRICKET's:  Dr. Hester (Hospitalist)    Discussion of management with other [facility] or appropriate source(s): None     DISPOSITION:  Patient will be hospitalized by Dr. Stauffer in guarded condition.    FINAL DIANGOSIS  1. Tachycardia    2. Hyperthyroidism    3. Palpitations          ILatanya (Maximilian), am scribing for, and in the presence of, John Lombardo M.D..    Electronically signed by: Latanya Da Silva (Maximilian), 3/10/2023    John CARRERA M.D. personally performed the services described in this documentation, as scribed by Latanya Da Silva in my presence, and it is both accurate and complete.   The note accurately reflects work and decisions made by me.  John Lombardo M.D.  3/10/2023  1:26 PM

## 2023-03-10 NOTE — ED TRIAGE NOTES
"Chief Complaint   Patient presents with    Medication Reaction     Pt started on Lyrica on Tuesday and gabapentin was stopped two days prior to that.  Pt went to urgent care on Wednesday and was instructed to stop taking the Lyrica.      Tachycardia     Pt has noticed elevated HR since Tuesday.  Pt currently on a beta blocker. Pt was instructed to come to the ED if her heart rate did not improve.       /83   Pulse (!) 140   Temp 36.6 °C (97.8 °F) (Temporal)   Resp 20   Ht 1.6 m (5' 3\")   Wt 58.1 kg (128 lb 1.4 oz)   SpO2 98%   BMI 22.69 kg/m²     Pt ambulatory from lobby with steady gait.  Pt states she has had abnormal thyroid results in the past and her doctor has mentioned wanted to check her thyroid. Family concerned about thyroid storm. Pt's HR sustaining in 130s in triage.  EKG ordered.     Pt is alert and oriented, speaking in full sentences, follows commands and responds appropriately to questions. Resp are even and unlabored.      Pt placed in lobby. Pt educated on triage process. Pt encouraged to alert staff for any changes.     Patient and staff wearing appropriate PPE    "

## 2023-03-10 NOTE — ASSESSMENT & PLAN NOTE
- TSH is less than 0.005, and free T4 5.33.  Her free T3 in January was also significantly elevated in January at 12.3.  She has not been started on any antithyroid medications, and was only started on metoprolol.  She does not have any palpable thyroid enlargement or goiter.  -She is symptomatic, with persistent tachycardia, brain fogginess, constipation.  Reasonable to start on methimazole 10 mg twice daily (20 mg daily) given 2-3 times elevation of T4.  Twice daily split dosing for now for 1 to 2 weeks, then change to daily to minimize GI side effects from methimazole.  -Change beta-blocker to beta-1 selective atenolol 50 mg daily.  -Will obtain ultrasound of the thyroid.  -She needs outpatient follow-up with outpatient endocrinology.

## 2023-03-10 NOTE — ED NOTES
Med Rec completed per patient   Allergies reviewed  No ORAL antibiotics in last 30 days    Patient takes Metoprolol Succinate TWICE a day     Patient STOPPED taking Gabapentin on 3/5/2023  Patient STOPPED taking Lyrica after one dose on 3/7/2023

## 2023-03-10 NOTE — H&P
Hospital Medicine History & Physical Note    Date of Service  3/10/2023    Primary Care Physician  Pcp Pt States None    Consultants  None    Code Status  Full Code    Chief Complaint  Chief Complaint   Patient presents with    Medication Reaction     Pt started on Lyrica on Tuesday and gabapentin was stopped two days prior to that.  Pt went to urgent care on Wednesday and was instructed to stop taking the Lyrica.      Tachycardia     Pt has noticed elevated HR since Tuesday.  Pt currently on a beta blocker. Pt was instructed to come to the ED if her heart rate did not improve.         History of Presenting Illness  Nesha Mart is a 48 y.o. female with chronic low back pain, who presented 3/10/2023 with shaking, sweating, palpitations and tachycardia.  Patient shared that she has recently started on Lyrica 3 days ago after she was gradually weaned off gabapentin (originally takes 2700 mg daily).  Since starting on Lyrica, she noticed shaking, sweating and palpitations.  She also felt lightheaded, and nauseated.  She has been constipated for the past 2 days.  She went to an urgent care center, and she was noted to have tachycardia of 169, and she was sent to the ED.  Notably, she is being worked up for hyperthyroidism due to her family history, and was noted to have low TSH, and high T3 and T4 levels.  She was started on metoprolol 1 month ago, however she shared that she had persistently elevated heart rate.  She has not been started on any antithyroid medications yet.    ED course:  On evaluation, she was tachycardic with heart rate in the 140s.  She was afebrile.  Initial blood work-up were unimpressive except for BUN of 28.  Electrolytes and renal function are normal.  TSH was low at less than 0.005, and free T4 was 5.33.  EKG showed sinus tachycardia.  Patient was given IV fluids.  She was subsequently admitted to the CDU.    I discussed the plan of care with patient, family, , and ERP  ".    Review of Systems  ROS    Pertinent positives/negatives as mentioned above.     A complete review of systems was personally done by me. All other systems were negative.       Past Medical History  Chronic low back pain    Surgical History  Reviewed.  No past surgical history.    Family History    Family history reviewed with patient. There is no family history that is pertinent to the chief complaint.     Social History   reports that she has quit smoking. Her smoking use included cigarettes. She has never used smokeless tobacco. She reports that she does not currently use alcohol. She reports that she does not use drugs.    Allergies  Allergies   Allergen Reactions    Lyrica Palpitations     Pt states she \"felt weird\" and noticed elevated HR       Medications  Prior to Admission Medications   Prescriptions Last Dose Informant Patient Reported? Taking?   Cholecalciferol (VITAMIN D3) 125 MCG (5000 UT) Cap 3/9/2023 at 1600 Patient Yes Yes   Sig: Take 5,000 Units by mouth every day.   MAGNESIUM PO 3/9/2023 at AM Patient Yes Yes   Sig: Take 1 Tablet by mouth every day.   cyclobenzaprine (FLEXERIL) 10 mg Tab 3/10/2023 at 0400 Patient Yes No   Sig: Take 10 mg by mouth in the morning, at noon, and at bedtime.   docusate sodium (COLACE) 100 MG Cap 3/10/2023 at 0400 Patient Yes Yes   Sig: Take 100 mg by mouth 2 times a day.   escitalopram (LEXAPRO) 20 MG tablet 3/9/2023 at 1600 Patient Yes No   Sig: Take 20 mg by mouth every day.   gabapentin (NEURONTIN) 300 MG Cap 3/5/2023 at STOPPED Patient Yes No   Sig: Take 300 mg by mouth at bedtime.   meloxicam (MOBIC) 15 MG tablet 3/9/2023 at 1600 Patient Yes No   Sig: Take 15 mg by mouth every day.   metaxalone (SKELAXIN) 800 MG Tab 3/10/2023 at 0400 Patient Yes No   Sig: Take 800 mg by mouth 3 times a day.   metoprolol SR (TOPROL XL) 25 MG TABLET SR 24 HR 3/10/2023 at 0400 Patient Yes No   Sig: Take 12.5 mg by mouth 2 times a day.   ondansetron (ZOFRAN ODT) 4 MG TABLET " DISPERSIBLE 3/8/2023 at [facility] Patient No No   Sig: Take 1 Tablet by mouth every 8 hours as needed for Nausea/Vomiting.   pregabalin (LYRICA) 50 MG capsule 3/7/2023 at STOPPED Patient Yes No   Sig: Take 50 mg by mouth every day.      Facility-Administered Medications: None       Physical Exam  Temp:  [36.6 °C (97.8 °F)] 36.6 °C (97.8 °F)  Pulse:  [] 86  Resp:  [18-49] 18  BP: (111-125)/(64-83) 119/64  SpO2:  [95 %-98 %] 95 %  Blood Pressure: 119/64   Temperature: 36.6 °C (97.8 °F)   Pulse: 86   Respiration: 18   Pulse Oximetry: 95 %       Physical Exam  Vitals reviewed.   Constitutional:       General: She is not in acute distress.     Appearance: Normal appearance. She is normal weight. She is not ill-appearing or diaphoretic.   HENT:      Head: Normocephalic and atraumatic.      Right Ear: External ear normal.      Left Ear: External ear normal.      Mouth/Throat:      Mouth: Mucous membranes are moist.      Pharynx: No oropharyngeal exudate or posterior oropharyngeal erythema.   Eyes:      General: No scleral icterus.     Extraocular Movements: Extraocular movements intact.      Conjunctiva/sclera: Conjunctivae normal.      Pupils: Pupils are equal, round, and reactive to light.      Comments: (-) Proptosis   Cardiovascular:      Rate and Rhythm: Regular rhythm. Tachycardia present.      Heart sounds: Normal heart sounds. No murmur heard.  Pulmonary:      Effort: Pulmonary effort is normal. No respiratory distress.      Breath sounds: Normal breath sounds. No stridor. No wheezing, rhonchi or rales.   Chest:      Chest wall: No tenderness.   Abdominal:      General: Bowel sounds are normal. There is no distension.      Palpations: Abdomen is soft. There is no mass.      Tenderness: There is no abdominal tenderness. There is no guarding or rebound.   Musculoskeletal:         General: No swelling. Normal range of motion.      Cervical back: Normal range of motion and neck supple. No rigidity. No muscular  tenderness.      Right lower leg: No edema.      Left lower leg: No edema.   Lymphadenopathy:      Cervical: No cervical adenopathy.   Skin:     General: Skin is warm and dry.      Coloration: Skin is not jaundiced.      Findings: No rash.   Neurological:      General: No focal deficit present.      Mental Status: She is alert and oriented to person, place, and time. Mental status is at baseline.      Cranial Nerves: No cranial nerve deficit.   Psychiatric:         Mood and Affect: Mood normal.         Behavior: Behavior normal.         Thought Content: Thought content normal.         Judgment: Judgment normal.       Laboratory:  Recent Labs     03/10/23  0927   WBC 6.7   RBC 4.29   HEMOGLOBIN 12.5   HEMATOCRIT 37.0   MCV 86.2   MCH 29.1   MCHC 33.8   RDW 37.7   PLATELETCT 380   MPV 9.1     Recent Labs     03/10/23  0927   SODIUM 138   POTASSIUM 3.8   CHLORIDE 104   CO2 22   GLUCOSE 94   BUN 28*   CREATININE 0.69   CALCIUM 9.8     Recent Labs     03/10/23  0927   ALTSGPT 46   ASTSGOT 35   ALKPHOSPHAT 98   TBILIRUBIN 0.2   GLUCOSE 94         No results for input(s): NTPROBNP in the last 72 hours.      No results for input(s): TROPONINT in the last 72 hours.    Imaging:  US-THYROID    (Results Pending)   EC-ECHOCARDIOGRAM COMPLETE W/O CONT    (Results Pending)       EKG reviewed as above.    Assessment/Plan:  Justification for Admission Status  I anticipate this patient is appropriate for observation status at this time because tachycardia, hyperthyroidism.      * Tachycardia- (present on admission)  Assessment & Plan  - Sinus tachycardia.  Suspect this is related to Lyrica intolerance, however this is in setting of baseline hyperthyroidism.  -Lyrica will be stopped.  Hold off on restarting gabapentin, and deferred this outpatient pain management specialist.  -Management of hyperthyroidism as below.  -She may be dry as well, with elevated BUN.  Continue IV fluid rehydration, start LR at 125 cc/h.  -Monitor on  telemetry.  Check baseline echocardiogram.    Hyperthyroidism- (present on admission)  Assessment & Plan  - TSH is less than 0.005, and free T4 5.33.  Her free T3 in January was also significantly elevated in January at 12.3.  She has not been started on any antithyroid medications, and was only started on metoprolol.  She does not have any palpable thyroid enlargement or goiter.  -She is symptomatic, with persistent tachycardia, brain fogginess, constipation.  Reasonable to start on methimazole 10 mg twice daily (20 mg daily) given 2-3 times elevation of T4.  Twice daily split dosing for now for 1 to 2 weeks, then change to daily to minimize GI side effects from methimazole.  -Change beta-blocker to beta-1 selective atenolol 50 mg daily.  -Will obtain ultrasound of the thyroid.  -She needs outpatient follow-up with outpatient endocrinology.    Chronic back pain- (present on admission)  Assessment & Plan  - She has been seeing an outpatient pain specialist.  Unfortunately, developed adverse reaction to Lyrica.  Hold off on Lyrica for now, and also hold off on gabapentin with deference to outpatient pain management to start alternative medications.  Resume home Flexeril, Lexapro, Mobic and Skelaxin.        VTE prophylaxis: SCDs/TEDs

## 2023-03-11 ENCOUNTER — PHARMACY VISIT (OUTPATIENT)
Dept: PHARMACY | Facility: MEDICAL CENTER | Age: 48
End: 2023-03-11
Payer: MEDICARE

## 2023-03-11 VITALS
SYSTOLIC BLOOD PRESSURE: 142 MMHG | DIASTOLIC BLOOD PRESSURE: 81 MMHG | BODY MASS INDEX: 21.26 KG/M2 | TEMPERATURE: 98.2 F | OXYGEN SATURATION: 95 % | RESPIRATION RATE: 18 BRPM | WEIGHT: 120 LBS | HEART RATE: 93 BPM | HEIGHT: 63 IN

## 2023-03-11 LAB
ANION GAP SERPL CALC-SCNC: 10 MMOL/L (ref 7–16)
BASOPHILS # BLD AUTO: 0.3 % (ref 0–1.8)
BASOPHILS # BLD: 0.02 K/UL (ref 0–0.12)
BUN SERPL-MCNC: 13 MG/DL (ref 8–22)
CALCIUM SERPL-MCNC: 9.7 MG/DL (ref 8.5–10.5)
CHLORIDE SERPL-SCNC: 105 MMOL/L (ref 96–112)
CO2 SERPL-SCNC: 23 MMOL/L (ref 20–33)
CREAT SERPL-MCNC: 0.39 MG/DL (ref 0.5–1.4)
EOSINOPHIL # BLD AUTO: 0.03 K/UL (ref 0–0.51)
EOSINOPHIL NFR BLD: 0.5 % (ref 0–6.9)
ERYTHROCYTE [DISTWIDTH] IN BLOOD BY AUTOMATED COUNT: 37.8 FL (ref 35.9–50)
GFR SERPLBLD CREATININE-BSD FMLA CKD-EPI: 123 ML/MIN/1.73 M 2
GLUCOSE SERPL-MCNC: 87 MG/DL (ref 65–99)
HCT VFR BLD AUTO: 34.3 % (ref 37–47)
HGB BLD-MCNC: 11.4 G/DL (ref 12–16)
IMM GRANULOCYTES # BLD AUTO: 0.03 K/UL (ref 0–0.11)
IMM GRANULOCYTES NFR BLD AUTO: 0.5 % (ref 0–0.9)
LYMPHOCYTES # BLD AUTO: 1.55 K/UL (ref 1–4.8)
LYMPHOCYTES NFR BLD: 25.9 % (ref 22–41)
MCH RBC QN AUTO: 28.9 PG (ref 27–33)
MCHC RBC AUTO-ENTMCNC: 33.2 G/DL (ref 33.6–35)
MCV RBC AUTO: 87.1 FL (ref 81.4–97.8)
MONOCYTES # BLD AUTO: 0.51 K/UL (ref 0–0.85)
MONOCYTES NFR BLD AUTO: 8.5 % (ref 0–13.4)
NEUTROPHILS # BLD AUTO: 3.85 K/UL (ref 2–7.15)
NEUTROPHILS NFR BLD: 64.3 % (ref 44–72)
NRBC # BLD AUTO: 0 K/UL
NRBC BLD-RTO: 0 /100 WBC
PLATELET # BLD AUTO: 333 K/UL (ref 164–446)
PMV BLD AUTO: 9.2 FL (ref 9–12.9)
POTASSIUM SERPL-SCNC: 4 MMOL/L (ref 3.6–5.5)
RBC # BLD AUTO: 3.94 M/UL (ref 4.2–5.4)
SODIUM SERPL-SCNC: 138 MMOL/L (ref 135–145)
WBC # BLD AUTO: 6 K/UL (ref 4.8–10.8)

## 2023-03-11 PROCEDURE — RXMED WILLOW AMBULATORY MEDICATION CHARGE: Performed by: INTERNAL MEDICINE

## 2023-03-11 PROCEDURE — A9270 NON-COVERED ITEM OR SERVICE: HCPCS | Performed by: INTERNAL MEDICINE

## 2023-03-11 PROCEDURE — 99239 HOSP IP/OBS DSCHRG MGMT >30: CPT | Performed by: INTERNAL MEDICINE

## 2023-03-11 PROCEDURE — 700105 HCHG RX REV CODE 258: Performed by: INTERNAL MEDICINE

## 2023-03-11 PROCEDURE — G0378 HOSPITAL OBSERVATION PER HR: HCPCS

## 2023-03-11 PROCEDURE — 700102 HCHG RX REV CODE 250 W/ 637 OVERRIDE(OP): Performed by: INTERNAL MEDICINE

## 2023-03-11 PROCEDURE — 85025 COMPLETE CBC W/AUTO DIFF WBC: CPT

## 2023-03-11 PROCEDURE — 80048 BASIC METABOLIC PNL TOTAL CA: CPT

## 2023-03-11 RX ORDER — ATENOLOL 50 MG/1
50 TABLET ORAL DAILY
Qty: 30 TABLET | Refills: 0 | Status: SHIPPED | OUTPATIENT
Start: 2023-03-12 | End: 2023-09-13

## 2023-03-11 RX ORDER — METHIMAZOLE 10 MG/1
TABLET ORAL
Qty: 60 TABLET | Refills: 0 | Status: SHIPPED | OUTPATIENT
Start: 2023-03-11 | End: 2023-09-13

## 2023-03-11 RX ADMIN — ATENOLOL 50 MG: 25 TABLET ORAL at 04:43

## 2023-03-11 RX ADMIN — CHOLECALCIFEROL TAB 125 MCG (5000 UNIT) 5000 UNITS: 125 TAB at 04:44

## 2023-03-11 RX ADMIN — MELOXICAM 15 MG: 7.5 TABLET ORAL at 04:43

## 2023-03-11 RX ADMIN — ESCITALOPRAM OXALATE 20 MG: 10 TABLET ORAL at 04:42

## 2023-03-11 RX ADMIN — METHIMAZOLE 10 MG: 10 TABLET ORAL at 04:44

## 2023-03-11 RX ADMIN — SODIUM CHLORIDE, POTASSIUM CHLORIDE, SODIUM LACTATE AND CALCIUM CHLORIDE 1000 ML: 600; 310; 30; 20 INJECTION, SOLUTION INTRAVENOUS at 03:49

## 2023-03-11 ASSESSMENT — PAIN DESCRIPTION - PAIN TYPE: TYPE: ACUTE PAIN;CHRONIC PAIN

## 2023-03-11 NOTE — PROGRESS NOTES
Patient seen by MD, dressed, telemetry box removed for discharge. Orders for discharge lounge entered. Patient has all belongings on person.

## 2023-03-11 NOTE — PROGRESS NOTES
4 Eyes Skin Assessment Completed by Sarai RN and MAISHA Hauser.    Head WDL  Ears WDL  Nose WDL  Mouth WDL  Neck WDL  Breast/Chest WDL  Shoulder Blades WDL  Spine WDL  (R) Arm/Elbow/Hand WDL  (L) Arm/Elbow/Hand WDL  Abdomen WDL  Groin WDL  Scrotum/Coccyx/Buttocks WDL  (R) Leg WDL  (L) Leg WDL  (R) Heel/Foot/Toe WDL  (L) Heel/Foot/Toe WDL          Devices In Places Tele Box, Blood Pressure Cuff, and Pulse Ox      Interventions In Place Pillows and Pressure Redistribution Mattress    Possible Skin Injury No    Pictures Uploaded Into Epic N/A  Wound Consult Placed N/A  RN Wound Prevention Protocol Ordered No

## 2023-03-11 NOTE — CARE PLAN
The patient is Stable - Low risk of patient condition declining or worsening         Progress made toward(s) clinical / shift goals:  Patient educated on plan of care and medications and pain remained at comfort goal.  Patient rested comfortably.      Problem: Knowledge Deficit - Standard  Goal: Patient and family/care givers will demonstrate understanding of plan of care, disease process/condition, diagnostic tests and medications  Outcome: Met     Problem: Pain - Standard  Goal: Alleviation of pain or a reduction in pain to the patient’s comfort goal  Outcome: Met

## 2023-03-11 NOTE — DISCHARGE SUMMARY
Discharge Summary    CHIEF COMPLAINT ON ADMISSION  Chief Complaint   Patient presents with    Medication Reaction     Pt started on Lyrica on Tuesday and gabapentin was stopped two days prior to that.  Pt went to urgent care on Wednesday and was instructed to stop taking the Lyrica.      Tachycardia     Pt has noticed elevated HR since Tuesday.  Pt currently on a beta blocker. Pt was instructed to come to the ED if her heart rate did not improve.         Reason for Admission  medication reaction , sent by       Admission Date  3/10/2023    CODE STATUS  Full Code    HPI & HOSPITAL COURSE  Nesha Mart is a 48 y.o. female with chronic low back pain, who presented 3/10/2023 with shaking, sweating, palpitations and tachycardia.  Patient shared that she has recently started on Lyrica 3 days ago after she was gradually weaned off gabapentin (originally takes 2700 mg daily).  Since starting on Lyrica, she noticed shaking, sweating and palpitations.  She also felt lightheaded, and nauseated.  She has been constipated for the past 2 days.  She went to an urgent care center, and she was noted to have tachycardia of 169, and she was sent to the ED.  Notably, she is being worked up for hyperthyroidism due to her family history, and was noted to have low TSH, and high T3 and T4 levels, along with high anti-TPO antibodies.  She was started on metoprolol 1 month ago, however she shared that she had persistently elevated heart rate.  She has not been started on any antithyroid medications yet.     On evaluation, she was tachycardic with heart rate in the 140s.  She was afebrile.  Initial blood work-up were unimpressive except for BUN of 28.  Electrolytes and renal function are normal.  TSH was low at less than 0.005, free T4 was elevated 5.33, and T3 was high at 375.  EKG showed sinus tachycardia.  Patient was given IV fluids.  Thyroid ultrasound was obtained which showed increased vascularity of the thyroid glands suggesting  thyroiditis, with no focal nodule.  She was felt to have hyperthyroidism, possibly Hashimoto's, and thus she was started on methimazole, along with atenolol.  Echocardiogram was obtained for baseline measurements, and it was normal.    She clinically improved.  Her tachycardia has improved, and she has maintained normal heart rate with a dose of the beta-blocker.  She had improved sense of wellbeing.  She remained hemodynamically stable and afebrile. I have personally seen and examined the patient on the day of discharge. With her clinical improvement, she was deemed ready to discharge from the hospital as she did not have any further hospitalization needs. Patient felt comfortable going home. The discharge plan was discussed with the patient, with which she was agreeable to.     Therefore, she is discharged in good and stable condition to home with close outpatient follow-up.      Discharge Date  3/11/2023      FOLLOW UP ITEMS POST DISCHARGE  -She will continue on methimazole 10 mg twice daily for 2 weeks then once daily thereafter.  She will also continue on atenolol.  She has an appointment with an endocrinologist on 3/13/2023 which she will keep.  -Hold off on Lyrica, and gabapentin.  She will follow-up with her outpatient pain management specialist for further determination of alternative pain regimen.  -Follow-up with PCP.  - counseled to seek immediate medical attention, or return to the ED for recurrent or worsening symptoms.      DISCHARGE DIAGNOSES  Principal Problem:    Tachycardia POA: Yes  Active Problems:    Hyperthyroidism POA: Yes    Chronic back pain POA: Yes  Resolved Problems:    * No resolved hospital problems. *      FOLLOW UP  No future appointments.  No follow-up provider specified.    MEDICATIONS ON DISCHARGE     Medication List        START taking these medications        Instructions   atenolol 50 MG Tabs  Start taking on: March 12, 2023  Commonly known as: TENORMIN   Take 1 Tablet by mouth  "every day.  Dose: 50 mg     methimazole 10 MG Tabs  Commonly known as: TAPAZOLE   Take 10 mg twice a day for 2 weeks, then 10 mg daily after            CONTINUE taking these medications        Instructions   cyclobenzaprine 10 mg Tabs  Commonly known as: Flexeril   Take 10 mg by mouth in the morning, at noon, and at bedtime.  Dose: 10 mg     docusate sodium 100 MG Caps  Commonly known as: COLACE   Take 100 mg by mouth 2 times a day.  Dose: 100 mg     escitalopram 20 MG tablet  Commonly known as: LEXAPRO   Take 20 mg by mouth every day.  Dose: 20 mg     gabapentin 300 MG Caps  Commonly known as: NEURONTIN   Take 300 mg by mouth at bedtime.  Dose: 300 mg     MAGNESIUM PO   Take 1 Tablet by mouth every day.  Dose: 1 Tablet     meloxicam 15 MG tablet  Commonly known as: MOBIC   Take 15 mg by mouth every day.  Dose: 15 mg     metaxalone 800 MG Tabs  Commonly known as: Skelaxin   Take 800 mg by mouth 3 times a day.  Dose: 800 mg     ondansetron 4 MG Tbdp  Commonly known as: ZOFRAN ODT   Take 1 Tablet by mouth every 8 hours as needed for Nausea/Vomiting.  Dose: 4 mg     vitamin D3 125 MCG (5000 UT) Caps   Take 5,000 Units by mouth every day.  Dose: 5,000 Units            STOP taking these medications      metoprolol SR 25 MG Tb24  Commonly known as: TOPROL XL     pregabalin 50 MG capsule  Commonly known as: LYRICA              Allergies  Allergies   Allergen Reactions    Lyrica Palpitations     Pt states she \"felt weird\" and noticed elevated HR       DIET  Orders Placed This Encounter   Procedures    Diet Order Diet: Regular     Standing Status:   Standing     Number of Occurrences:   1     Order Specific Question:   Diet:     Answer:   Regular [1]       ACTIVITY  As tolerated.  Weight bearing as tolerated    CONSULTATIONS  None    PROCEDURES  None    LABORATORY  Lab Results   Component Value Date    SODIUM 138 03/10/2023    POTASSIUM 3.8 03/10/2023    CHLORIDE 104 03/10/2023    CO2 22 03/10/2023    GLUCOSE 94 03/10/2023 "    BUN 28 (H) 03/10/2023    CREATININE 0.69 03/10/2023        Lab Results   Component Value Date    WBC 6.0 03/11/2023    HEMOGLOBIN 11.4 (L) 03/11/2023    HEMATOCRIT 34.3 (L) 03/11/2023    PLATELETCT 333 03/11/2023        Total time of the discharge process = 42 minutes.

## 2023-04-13 ENCOUNTER — HOSPITAL ENCOUNTER (OUTPATIENT)
Dept: LAB | Facility: MEDICAL CENTER | Age: 48
End: 2023-04-13
Attending: INTERNAL MEDICINE
Payer: COMMERCIAL

## 2023-04-13 LAB
ALBUMIN SERPL BCP-MCNC: 4.2 G/DL (ref 3.2–4.9)
ALBUMIN/GLOB SERPL: 1.2 G/DL
ALP SERPL-CCNC: 86 U/L (ref 30–99)
ALT SERPL-CCNC: 30 U/L (ref 2–50)
ALT SERPL-CCNC: 32 U/L (ref 2–50)
ANION GAP SERPL CALC-SCNC: 10 MMOL/L (ref 7–16)
AST SERPL-CCNC: 26 U/L (ref 12–45)
AST SERPL-CCNC: 26 U/L (ref 12–45)
BASOPHILS # BLD AUTO: 0.6 % (ref 0–1.8)
BASOPHILS # BLD: 0.03 K/UL (ref 0–0.12)
BILIRUB SERPL-MCNC: 0.3 MG/DL (ref 0.1–1.5)
BILIRUB SERPL-MCNC: 0.3 MG/DL (ref 0.1–1.5)
BUN SERPL-MCNC: 18 MG/DL (ref 8–22)
CALCIUM ALBUM COR SERPL-MCNC: 9.9 MG/DL (ref 8.5–10.5)
CALCIUM SERPL-MCNC: 10.1 MG/DL (ref 8.5–10.5)
CHLORIDE SERPL-SCNC: 105 MMOL/L (ref 96–112)
CO2 SERPL-SCNC: 24 MMOL/L (ref 20–33)
CREAT SERPL-MCNC: 0.57 MG/DL (ref 0.5–1.4)
EOSINOPHIL # BLD AUTO: 0.13 K/UL (ref 0–0.51)
EOSINOPHIL NFR BLD: 2.8 % (ref 0–6.9)
ERYTHROCYTE [DISTWIDTH] IN BLOOD BY AUTOMATED COUNT: 41.2 FL (ref 35.9–50)
GFR SERPLBLD CREATININE-BSD FMLA CKD-EPI: 112 ML/MIN/1.73 M 2
GLOBULIN SER CALC-MCNC: 3.4 G/DL (ref 1.9–3.5)
GLUCOSE SERPL-MCNC: 100 MG/DL (ref 65–99)
HBV SURFACE AG SER QL: NORMAL
HCT VFR BLD AUTO: 40.5 % (ref 37–47)
HCV AB SER QL: NORMAL
HGB BLD-MCNC: 13.2 G/DL (ref 12–16)
IMM GRANULOCYTES # BLD AUTO: 0.01 K/UL (ref 0–0.11)
IMM GRANULOCYTES NFR BLD AUTO: 0.2 % (ref 0–0.9)
LYMPHOCYTES # BLD AUTO: 1.89 K/UL (ref 1–4.8)
LYMPHOCYTES NFR BLD: 40.2 % (ref 22–41)
MCH RBC QN AUTO: 28.3 PG (ref 27–33)
MCHC RBC AUTO-ENTMCNC: 32.6 G/DL (ref 33.6–35)
MCV RBC AUTO: 86.9 FL (ref 81.4–97.8)
MONOCYTES # BLD AUTO: 0.29 K/UL (ref 0–0.85)
MONOCYTES NFR BLD AUTO: 6.2 % (ref 0–13.4)
NEUTROPHILS # BLD AUTO: 2.35 K/UL (ref 2–7.15)
NEUTROPHILS NFR BLD: 50 % (ref 44–72)
NRBC # BLD AUTO: 0 K/UL
NRBC BLD-RTO: 0 /100 WBC
PLATELET # BLD AUTO: 336 K/UL (ref 164–446)
PMV BLD AUTO: 9.7 FL (ref 9–12.9)
POTASSIUM SERPL-SCNC: 4.7 MMOL/L (ref 3.6–5.5)
PROT SERPL-MCNC: 7.6 G/DL (ref 6–8.2)
RBC # BLD AUTO: 4.66 M/UL (ref 4.2–5.4)
SODIUM SERPL-SCNC: 139 MMOL/L (ref 135–145)
T3 SERPL-MCNC: 229 NG/DL (ref 60–181)
T4 FREE SERPL-MCNC: 2.95 NG/DL (ref 0.93–1.7)
TSH SERPL DL<=0.005 MIU/L-ACNC: <0.005 UIU/ML (ref 0.38–5.33)
WBC # BLD AUTO: 4.7 K/UL (ref 4.8–10.8)

## 2023-04-13 PROCEDURE — 84445 ASSAY OF TSI GLOBULIN: CPT

## 2023-04-13 PROCEDURE — 84443 ASSAY THYROID STIM HORMONE: CPT

## 2023-04-13 PROCEDURE — 36415 COLL VENOUS BLD VENIPUNCTURE: CPT

## 2023-04-13 PROCEDURE — 82247 BILIRUBIN TOTAL: CPT

## 2023-04-13 PROCEDURE — 86001 ALLERGEN SPECIFIC IGG: CPT | Mod: 91

## 2023-04-13 PROCEDURE — 84460 ALANINE AMINO (ALT) (SGPT): CPT

## 2023-04-13 PROCEDURE — 86803 HEPATITIS C AB TEST: CPT

## 2023-04-13 PROCEDURE — 86003 ALLG SPEC IGE CRUDE XTRC EA: CPT

## 2023-04-13 PROCEDURE — 86008 ALLG SPEC IGE RECOMB EA: CPT

## 2023-04-13 PROCEDURE — 85025 COMPLETE CBC W/AUTO DIFF WBC: CPT

## 2023-04-13 PROCEDURE — 87340 HEPATITIS B SURFACE AG IA: CPT

## 2023-04-13 PROCEDURE — 80053 COMPREHEN METABOLIC PANEL: CPT

## 2023-04-13 PROCEDURE — 84439 ASSAY OF FREE THYROXINE: CPT

## 2023-04-13 PROCEDURE — 84480 ASSAY TRIIODOTHYRONINE (T3): CPT

## 2023-04-13 PROCEDURE — 84450 TRANSFERASE (AST) (SGOT): CPT

## 2023-04-16 LAB
APPLE IGE QN: <0.1 KU/L
BANANA IGG AB [MASS/VOLUME] IN SERUM: 49.4 MCG/ML
BEEF IGG-MCNC: 10.8 MCG/ML
CASEIN IGE QN: <0.1 KU/L
CHICKEN IGG-MCNC: 9.08 MCG/ML
CHOCOLATE IGE QN: <0.1 KU/L
CORN IGG-MCNC: 13.3 MCG/ML
DEPRECATED MISC ALLERGEN IGE RAST QL: NORMAL
EGG WHITE IGG-MCNC: 18.9 MCG/ML
EGG YOLK IGG-MCNC: 34.5 MCG/ML
ORANGE IGG-MCNC: 8.24 MCG/ML
POTATO IGE QN: <0.1 KU/L
SOYBEAN IGG-MCNC: 9.53 MCG/ML
TOMATO IGG-MCNC: 12.1 MCG/ML
TSI SER-ACNC: 5.06 IU/L
WHEAT IGG-MCNC: 13.2 MCG/ML

## 2023-07-27 ENCOUNTER — HOSPITAL ENCOUNTER (OUTPATIENT)
Dept: LAB | Facility: MEDICAL CENTER | Age: 48
End: 2023-07-27
Attending: INTERNAL MEDICINE
Payer: COMMERCIAL

## 2023-07-27 LAB
ALT SERPL-CCNC: 16 U/L (ref 2–50)
AST SERPL-CCNC: 10 U/L (ref 12–45)
BASOPHILS # BLD AUTO: 0.8 % (ref 0–1.8)
BASOPHILS # BLD: 0.02 K/UL (ref 0–0.12)
BILIRUB SERPL-MCNC: 0.4 MG/DL (ref 0.1–1.5)
DOHLE BOD BLD QL SMEAR: NORMAL
EOSINOPHIL # BLD AUTO: 0 K/UL (ref 0–0.51)
EOSINOPHIL NFR BLD: 0 % (ref 0–6.9)
ERYTHROCYTE [DISTWIDTH] IN BLOOD BY AUTOMATED COUNT: 44.2 FL (ref 35.9–50)
HCT VFR BLD AUTO: 36.4 % (ref 37–47)
HGB BLD-MCNC: 12.5 G/DL (ref 12–16)
LYMPHOCYTES # BLD AUTO: 1.22 K/UL (ref 1–4.8)
LYMPHOCYTES NFR BLD: 53 % (ref 22–41)
MANUAL DIFF BLD: NORMAL
MCH RBC QN AUTO: 30 PG (ref 27–33)
MCHC RBC AUTO-ENTMCNC: 34.3 G/DL (ref 32.2–35.5)
MCV RBC AUTO: 87.3 FL (ref 81.4–97.8)
METAMYELOCYTES NFR BLD MANUAL: 0.8 %
MONOCYTES # BLD AUTO: 0.41 K/UL (ref 0–0.85)
MONOCYTES NFR BLD AUTO: 17.7 % (ref 0–13.4)
MORPHOLOGY BLD-IMP: NORMAL
MYELOCYTES NFR BLD MANUAL: 0.8 %
NEUTROPHILS # BLD AUTO: 0.58 K/UL (ref 1.82–7.42)
NEUTROPHILS NFR BLD: 25.2 % (ref 44–72)
NRBC # BLD AUTO: 0 K/UL
NRBC BLD-RTO: 0 /100 WBC (ref 0–0.2)
PLATELET # BLD AUTO: 439 K/UL (ref 164–446)
PLATELET BLD QL SMEAR: NORMAL
PMV BLD AUTO: 10 FL (ref 9–12.9)
POLYCHROMASIA BLD QL SMEAR: NORMAL
PROMYELOCYTES NFR BLD MANUAL: 1.7 %
RBC # BLD AUTO: 4.17 M/UL (ref 4.2–5.4)
RBC BLD AUTO: PRESENT
T3 SERPL-MCNC: 107 NG/DL (ref 60–181)
T4 FREE SERPL-MCNC: 0.79 NG/DL (ref 0.93–1.7)
TOXIC GRANULES BLD QL SMEAR: NORMAL
TSH SERPL DL<=0.005 MIU/L-ACNC: 3.78 UIU/ML (ref 0.38–5.33)
WBC # BLD AUTO: 2.3 K/UL (ref 4.8–10.8)

## 2023-07-27 PROCEDURE — 84480 ASSAY TRIIODOTHYRONINE (T3): CPT

## 2023-07-27 PROCEDURE — 84443 ASSAY THYROID STIM HORMONE: CPT

## 2023-07-27 PROCEDURE — 84450 TRANSFERASE (AST) (SGOT): CPT

## 2023-07-27 PROCEDURE — 84439 ASSAY OF FREE THYROXINE: CPT

## 2023-07-27 PROCEDURE — 85007 BL SMEAR W/DIFF WBC COUNT: CPT

## 2023-07-27 PROCEDURE — 36415 COLL VENOUS BLD VENIPUNCTURE: CPT

## 2023-07-27 PROCEDURE — 84460 ALANINE AMINO (ALT) (SGPT): CPT

## 2023-07-27 PROCEDURE — 85025 COMPLETE CBC W/AUTO DIFF WBC: CPT

## 2023-07-27 PROCEDURE — 82247 BILIRUBIN TOTAL: CPT

## 2023-08-09 ENCOUNTER — OFFICE VISIT (OUTPATIENT)
Dept: MEDICAL GROUP | Facility: CLINIC | Age: 48
End: 2023-08-09
Payer: COMMERCIAL

## 2023-08-09 VITALS
BODY MASS INDEX: 28.2 KG/M2 | TEMPERATURE: 98.1 F | HEART RATE: 78 BPM | WEIGHT: 165.2 LBS | RESPIRATION RATE: 16 BRPM | DIASTOLIC BLOOD PRESSURE: 68 MMHG | HEIGHT: 64 IN | SYSTOLIC BLOOD PRESSURE: 102 MMHG | OXYGEN SATURATION: 96 %

## 2023-08-09 DIAGNOSIS — M54.50 CHRONIC LOW BACK PAIN, UNSPECIFIED BACK PAIN LATERALITY, UNSPECIFIED WHETHER SCIATICA PRESENT: ICD-10-CM

## 2023-08-09 DIAGNOSIS — E05.00 GRAVES DISEASE: ICD-10-CM

## 2023-08-09 DIAGNOSIS — G89.29 CHRONIC LOW BACK PAIN, UNSPECIFIED BACK PAIN LATERALITY, UNSPECIFIED WHETHER SCIATICA PRESENT: ICD-10-CM

## 2023-08-09 PROBLEM — R25.1 TREMOR: Status: ACTIVE | Noted: 2023-08-09

## 2023-08-09 PROBLEM — M25.561 CHRONIC PAIN OF RIGHT KNEE: Status: ACTIVE | Noted: 2023-08-09

## 2023-08-09 PROBLEM — G56.03 BILATERAL CARPAL TUNNEL SYNDROME: Status: ACTIVE | Noted: 2023-08-09

## 2023-08-09 PROBLEM — E55.9 VITAMIN D DEFICIENCY: Status: ACTIVE | Noted: 2023-08-09

## 2023-08-09 PROBLEM — M54.2 NECK PAIN: Status: ACTIVE | Noted: 2023-08-09

## 2023-08-09 PROBLEM — R53.82 CHRONIC FATIGUE: Status: ACTIVE | Noted: 2023-08-09

## 2023-08-09 PROBLEM — E78.49 OTHER HYPERLIPIDEMIA: Status: ACTIVE | Noted: 2023-08-09

## 2023-08-09 PROBLEM — F41.9 ANXIETY AND DEPRESSION: Status: ACTIVE | Noted: 2023-08-09

## 2023-08-09 PROBLEM — G47.09 OTHER INSOMNIA: Status: ACTIVE | Noted: 2023-08-09

## 2023-08-09 PROBLEM — M54.10 RADICULAR PAIN: Status: ACTIVE | Noted: 2023-08-09

## 2023-08-09 PROBLEM — F32.A ANXIETY AND DEPRESSION: Status: ACTIVE | Noted: 2023-08-09

## 2023-08-09 PROCEDURE — 99213 OFFICE O/P EST LOW 20 MIN: CPT | Performed by: PHYSICIAN ASSISTANT

## 2023-08-09 PROCEDURE — 3078F DIAST BP <80 MM HG: CPT | Performed by: PHYSICIAN ASSISTANT

## 2023-08-09 PROCEDURE — 3074F SYST BP LT 130 MM HG: CPT | Performed by: PHYSICIAN ASSISTANT

## 2023-08-09 RX ORDER — PROPRANOLOL HYDROCHLORIDE 80 MG/1
1 CAPSULE, EXTENDED RELEASE ORAL DAILY
COMMUNITY
End: 2023-08-09

## 2023-08-09 RX ORDER — PREGABALIN 50 MG/1
1 CAPSULE ORAL EVERY 12 HOURS
COMMUNITY
End: 2023-09-13

## 2023-08-09 RX ORDER — QUETIAPINE FUMARATE 25 MG/1
25 TABLET, FILM COATED ORAL
COMMUNITY
Start: 2023-06-16

## 2023-08-09 RX ORDER — PROPRANOLOL HYDROCHLORIDE 80 MG/1
80 CAPSULE, EXTENDED RELEASE ORAL DAILY
COMMUNITY
Start: 2023-06-08

## 2023-08-09 RX ORDER — CYCLOBENZAPRINE HCL 5 MG
1 TABLET ORAL 3 TIMES DAILY PRN
COMMUNITY
End: 2023-08-09

## 2023-08-09 RX ORDER — QUETIAPINE FUMARATE 25 MG/1
1 TABLET, FILM COATED ORAL
COMMUNITY
End: 2023-08-09

## 2023-08-09 RX ORDER — METOPROLOL SUCCINATE 25 MG/1
TABLET, EXTENDED RELEASE ORAL
COMMUNITY
End: 2023-09-13

## 2023-08-09 RX ORDER — HYDROXYZINE HYDROCHLORIDE 25 MG/1
TABLET, FILM COATED ORAL
COMMUNITY
Start: 2023-06-16

## 2023-08-09 RX ORDER — ONDANSETRON 8 MG/1
TABLET, ORALLY DISINTEGRATING ORAL
COMMUNITY
End: 2023-08-09

## 2023-08-09 RX ORDER — ESCITALOPRAM OXALATE 20 MG/1
1 TABLET ORAL DAILY
COMMUNITY
End: 2023-08-09

## 2023-08-09 RX ORDER — MELOXICAM 15 MG/1
1 TABLET ORAL EVERY 12 HOURS PRN
COMMUNITY
End: 2023-08-09

## 2023-08-09 RX ORDER — HYDROXYZINE HYDROCHLORIDE 25 MG/1
TABLET, FILM COATED ORAL
COMMUNITY
End: 2023-08-09

## 2023-08-09 RX ORDER — SODIUM, POTASSIUM,MAG SULFATES 17.5-3.13G
SOLUTION, RECONSTITUTED, ORAL ORAL
COMMUNITY
Start: 2023-06-23 | End: 2023-08-09

## 2023-08-09 ASSESSMENT — FIBROSIS 4 INDEX: FIB4 SCORE: 0.27

## 2023-08-09 NOTE — LETTER
August 9, 2023       Patient: Nesha Mart   YOB: 1975   Date of Visit: 8/9/2023         To Whom It May Concern:    Nesha Mart has just established care.  Requesting further records for evaluation and will need to follow up.      If you have any questions or concerns, please don't hesitate to call [phone]          Sincerely,          Génesis Sanchez P.A.-C.  Electronically Signed

## 2023-08-09 NOTE — PROGRESS NOTES
cc:  [facility] care    Subjective:     Nesha Mart is a 48 y.o. female presenting for [facility] care      Patient presents to the office for [facility] care.   Patient states that she needs to establish a primary care provider.  Patient states that she is needing paperwork filled out for [facility].  Patient states that she was taken off work from [employer] to get used to thyroid medication. She states that paperwork was filled out but there is an issue with the weight lifting criteria.  She states that she does see pain management and does have a history of back problems.   Paperwork was filled out by Dr. Cheema.      Review of systems:  See above.   Denies any symptoms unless previously indicated.        Current Outpatient Medications:     QUEtiapine (SEROQUEL) 25 MG Tab, Take 25 mg by mouth at bedtime., Disp: , Rfl:     propranolol CR (INDERAL LA) 80 MG CAPSULE SR 24 HR, Take 80 mg by mouth every day., Disp: , Rfl:     hydrOXYzine HCl (ATARAX) 25 MG Tab, TAKE 1-3 TABLETS BY MOUTH ONCE DAILY AT BEDTIME AS NEEDED FOR INSOMNIA AND ITCHING., Disp: , Rfl:     methimazole (TAPAZOLE) 10 MG Tab, Take 1 tablet by mouth 2 times a day for 2 weeks, THEN take 1 tablet daily after, Disp: 60 Tablet, Rfl: 0    Cholecalciferol (VITAMIN D3) 125 MCG (5000 UT) Cap, Take 5,000 Units by mouth every day., Disp: , Rfl:     docusate sodium (COLACE) 100 MG Cap, Take 100 mg by mouth 2 times a day., Disp: , Rfl:     MAGNESIUM PO, Take 1 Tablet by mouth every day., Disp: , Rfl:     cyclobenzaprine (FLEXERIL) 10 mg Tab, Take 10 mg by mouth in the morning, at noon, and at bedtime., Disp: , Rfl:     meloxicam (MOBIC) 15 MG tablet, Take 15 mg by mouth every day., Disp: , Rfl:     ondansetron (ZOFRAN ODT) 4 MG TABLET DISPERSIBLE, Take 1 Tablet by mouth every 8 hours as needed for Nausea/Vomiting., Disp: 6 Tablet, Rfl: 0    metaxalone (SKELAXIN) 800 MG Tab, Take 800 mg by mouth 3 times a day., Disp: , Rfl:     gabapentin (NEURONTIN) 300 MG Cap, Take 300 mg  "by mouth at bedtime., Disp: , Rfl:     escitalopram (LEXAPRO) 20 MG tablet, Take 20 mg by mouth every day., Disp: , Rfl:     pregabalin (LYRICA) 50 MG capsule, Take 1 Capsule by mouth every 12 hours. (Patient not taking: Reported on 8/9/2023), Disp: , Rfl:     metoprolol SR (TOPROL XL) 25 MG TABLET SR 24 HR, TAKE ONE-HALF TO ONE TABLET BY MOUTH DAILY TO CONTROL HEART RATE (Patient not taking: Reported on 8/9/2023), Disp: , Rfl:     atenolol (TENORMIN) 50 MG Tab, Take 1 Tablet by mouth every day. (Patient not taking: Reported on 8/9/2023), Disp: 30 Tablet, Rfl: 0    Allergies, past medical history, past surgical history, family history, social history reviewed and updated    Objective:     Vitals: /68 (BP Location: Left arm, Patient Position: Sitting, BP Cuff Size: Small adult)   Pulse 78   Temp 36.7 °C (98.1 °F) (Temporal)   Resp 16   Ht 1.626 m (5' 4\")   Wt 74.9 kg (165 lb 3.2 oz)   LMP 07/26/2023 (Within Days)   SpO2 96%   BMI 28.36 kg/m²   General: Alert, pleasant, NAD  EYES:   PERRL, EOMI, no icterus or pallor.  Conjunctivae and lids normal.   HENT:  Normocephalic.  External ears normal.  Neck supple.    Respiratory: Normal respiratory effort.    Abdomen: Not obese  Skin: Warm, dry, no rashes.  Musculoskeletal: Gait is normal.  Moves all extremities well.    Extremities: normal range of motion all extremities.   Neurological: No tremors, sensation grossly intact,  CN2-12 intact.  Psych:  Affect/mood is normal, judgement is good, memory is intact, grooming is appropriate.    Assessment/Plan:     Nesha Cruz was seen today for establish care.    Diagnoses and all orders for this visit:    Graves disease    Chronic low back pain, unspecified back pain laterality, unspecified whether sciatica present    Other orders  -     Cancel: Tdap Vaccine =>6YO IM  -     Cancel: Hepatitis B Vaccine Adult 20+    Will have patient follow-up ASAP.  She has paperwork that needs to be filled out for her employer.  " However we have no records to proceed off of and she is needing corrections.  Will obtain records from [facility] and try to have her follow-up within the next week.  Will order further testing based on records as well.        Return if symptoms worsen or fail to improve, for asa for paperwork..    Please note that this dictation was created using voice recognition software. I have made every reasonable attempt to correct obvious errors, but expect that there are errors of grammar and possible content that I did not discover before finalizing note.

## 2023-08-09 NOTE — LETTER
[facility]  Génesis Sanchez P.A.-C.  [address]  [facility] [address]  Fax: [phone]   Authorization for Release/Disclosure of   Protected Health Information   Name: NESHA BARNES : 1975 SSN: [id number]   Address: [address] [place] [id number] Phone:    [phone] (home)    I authorize the entity listed below to release/disclose the PHI below to:   [facility]/Génesis Sanchez P.A.-C. and Génesis Sanchez P.A.-C.   Provider [facility] Entity [facility]  [facility]     Address   City, State, [facility]   Phone:      Fax:     Reason for request: continuity of care   Information to be released:    [  ] LAST COLONOSCOPY,  including any PATH REPORT and follow-up  [  ] LAST FIT/COLOGUARD RESULT [  ] LAST DEXA  [  ] LAST MAMMOGRAM  [  ] LAST PAP  [  ] LAST LABS [  ] RETINA EXAM REPORT  [  ] IMMUNIZATION RECORDS  [ x ] Release all info      [  ] Check here and initial the line next to each item to release ALL health information INCLUDING  _____ Care and treatment for drug and / or alcohol abuse  _____ HIV testing, infection status, or AIDS  _____ Genetic Testing    DATES OF SERVICE OR TIME PERIOD TO BE DISCLOSED: _____________  I understand and acknowledge that:  * This Authorization may be revoked at any time by you in writing, except if your health information has already been used or disclosed.  * Your health information that will be used or disclosed as a result of you signing this authorization could be re-disclosed by the recipient. If this occurs, your re-disclosed health information may no longer be protected by State or Federal laws.  * You may refuse to sign this Authorization. Your refusal will not affect your ability to obtain treatment.  * This Authorization becomes effective upon signing and will  on (date) __________.      If no date is indicated, this Authorization will  one (1) year from the signature date.    Name: Nesha Barnes  Signature: Date:   2023      PLEASE FAX REQUESTED RECORDS BACK TO: [phone]

## 2023-08-14 ENCOUNTER — OFFICE VISIT (OUTPATIENT)
Dept: MEDICAL GROUP | Facility: CLINIC | Age: 48
End: 2023-08-14
Payer: COMMERCIAL

## 2023-08-14 VITALS
HEIGHT: 64 IN | WEIGHT: 170.2 LBS | DIASTOLIC BLOOD PRESSURE: 82 MMHG | RESPIRATION RATE: 16 BRPM | TEMPERATURE: 99 F | BODY MASS INDEX: 29.06 KG/M2 | OXYGEN SATURATION: 96 % | SYSTOLIC BLOOD PRESSURE: 136 MMHG | HEART RATE: 80 BPM

## 2023-08-14 DIAGNOSIS — E05.00 GRAVES DISEASE: ICD-10-CM

## 2023-08-14 DIAGNOSIS — Z23 NEED FOR VACCINATION: ICD-10-CM

## 2023-08-14 PROCEDURE — 3075F SYST BP GE 130 - 139MM HG: CPT | Performed by: PHYSICIAN ASSISTANT

## 2023-08-14 PROCEDURE — 3079F DIAST BP 80-89 MM HG: CPT | Performed by: PHYSICIAN ASSISTANT

## 2023-08-14 PROCEDURE — 90715 TDAP VACCINE 7 YRS/> IM: CPT | Performed by: PHYSICIAN ASSISTANT

## 2023-08-14 PROCEDURE — 99212 OFFICE O/P EST SF 10 MIN: CPT | Mod: 25 | Performed by: PHYSICIAN ASSISTANT

## 2023-08-14 PROCEDURE — 90471 IMMUNIZATION ADMIN: CPT | Performed by: PHYSICIAN ASSISTANT

## 2023-08-14 PROCEDURE — 90472 IMMUNIZATION ADMIN EACH ADD: CPT | Performed by: PHYSICIAN ASSISTANT

## 2023-08-14 PROCEDURE — 90746 HEPB VACCINE 3 DOSE ADULT IM: CPT | Performed by: PHYSICIAN ASSISTANT

## 2023-08-14 ASSESSMENT — FIBROSIS 4 INDEX: FIB4 SCORE: 0.27

## 2023-08-14 NOTE — LETTER
[place]  Génesis Sanchez P.A.-C.  [address]  [facility] [address]  Fax: [phone]   Authorization for Release/Disclosure of   Protected Health Information   Name: NESHA BARNES : 1975 SSN: [id number]   Address: [address] [place] [id number] Phone:    [phone] (home)    I authorize the entity listed below to release/disclose the PHI below to:   [facility]/Génesis Sanchez P.A.-C. and Génesis Sanchez P.A.-C.   Provider or Entity Name:  [facility]   Address   City, State, [facility]   Phone:      Fax:        Reason for request: continuity of care   Information to be released:    [ X ] LAST COLONOSCOPY,  including any PATH REPORT and follow-up  [ X ] LAST FIT/COLOGUARD RESULT [  ] LAST DEXA  [  ] LAST MAMMOGRAM  [  ] LAST PAP  [  ] LAST LABS [  ] RETINA EXAM REPORT  [  ] IMMUNIZATION RECORDS  [  ] Release all info      [  ] Check here and initial the line next to each item to release ALL health information INCLUDING  _____ Care and treatment for drug and / or alcohol abuse  _____ HIV testing, infection status, or AIDS  _____ Genetic Testing    DATES OF SERVICE OR TIME PERIOD TO BE DISCLOSED: _____________  I understand and acknowledge that:  * This Authorization may be revoked at any time by you in writing, except if your health information has already been used or disclosed.  * Your health information that will be used or disclosed as a result of you signing this authorization could be re-disclosed by the recipient. If this occurs, your re-disclosed health information may no longer be protected by State or Federal laws.  * You may refuse to sign this Authorization. Your refusal will not affect your ability to obtain treatment.  * This Authorization becomes effective upon signing and will  on (date) __________.      If no date is indicated, this Authorization will  one (1) year from the signature date.    Name: Nesha Barnes    Signature:    Date:     8/14/2023       PLEASE FAX REQUESTED RECORDS BACK TO: [phone]

## 2023-08-15 NOTE — PROGRESS NOTES
cc:  release to work    Subjective:     Nesha Mart is a 48 y.o. female presenting for release to work      Patient presents to the office for release to work.   Patient was originally taken off work due to hyperthyroidism.  She was taken off by a different provider.  Provider left the practice.  Before she left the practice the provider placed a weight restriction of no lifting greater than 30 lbs.  The most patient lifts is 10 lbs.  However [employer] would not accept the return to work due to the weight restriction.  Patient is here to have paperwork filled out and resubmitted.  She is not needing any restrictions at this time.     Review of systems:  See above.   Denies any symptoms unless previously indicated.        Current Outpatient Medications:     QUEtiapine (SEROQUEL) 25 MG Tab, Take 25 mg by mouth at bedtime., Disp: , Rfl:     propranolol CR (INDERAL LA) 80 MG CAPSULE SR 24 HR, Take 80 mg by mouth every day., Disp: , Rfl:     hydrOXYzine HCl (ATARAX) 25 MG Tab, TAKE 1-3 TABLETS BY MOUTH ONCE DAILY AT BEDTIME AS NEEDED FOR INSOMNIA AND ITCHING., Disp: , Rfl:     Cholecalciferol (VITAMIN D3) 125 MCG (5000 UT) Cap, Take 5,000 Units by mouth every day., Disp: , Rfl:     docusate sodium (COLACE) 100 MG Cap, Take 100 mg by mouth 2 times a day., Disp: , Rfl:     MAGNESIUM PO, Take 1 Tablet by mouth every day., Disp: , Rfl:     cyclobenzaprine (FLEXERIL) 10 mg Tab, Take 10 mg by mouth in the morning, at noon, and at bedtime., Disp: , Rfl:     meloxicam (MOBIC) 15 MG tablet, Take 15 mg by mouth every day., Disp: , Rfl:     ondansetron (ZOFRAN ODT) 4 MG TABLET DISPERSIBLE, Take 1 Tablet by mouth every 8 hours as needed for Nausea/Vomiting., Disp: 6 Tablet, Rfl: 0    gabapentin (NEURONTIN) 300 MG Cap, Take 300 mg by mouth at bedtime., Disp: , Rfl:     escitalopram (LEXAPRO) 20 MG tablet, Take 20 mg by mouth every day., Disp: , Rfl:     pregabalin (LYRICA) 50 MG capsule, Take 1 Capsule by mouth every 12 hours.  "(Patient not taking: Reported on 8/9/2023), Disp: , Rfl:     metoprolol SR (TOPROL XL) 25 MG TABLET SR 24 HR, TAKE ONE-HALF TO ONE TABLET BY MOUTH DAILY TO CONTROL HEART RATE (Patient not taking: Reported on 8/9/2023), Disp: , Rfl:     atenolol (TENORMIN) 50 MG Tab, Take 1 Tablet by mouth every day. (Patient not taking: Reported on 8/9/2023), Disp: 30 Tablet, Rfl: 0    methimazole (TAPAZOLE) 10 MG Tab, Take 1 tablet by mouth 2 times a day for 2 weeks, THEN take 1 tablet daily after (Patient not taking: Reported on 8/14/2023), Disp: 60 Tablet, Rfl: 0    metaxalone (SKELAXIN) 800 MG Tab, Take 800 mg by mouth 3 times a day. (Patient not taking: Reported on 8/14/2023), Disp: , Rfl:     Allergies, past medical history, past surgical history, family history, social history reviewed and updated    Objective:     Vitals: /82 (BP Location: Left arm, Patient Position: Sitting, BP Cuff Size: Adult)   Pulse 80   Temp 37.2 °C (99 °F) (Temporal)   Resp 16   Ht 1.626 m (5' 4\")   Wt 77.2 kg (170 lb 3.2 oz)   LMP 07/26/2023 (Within Days)   SpO2 96%   BMI 29.21 kg/m²   General: Alert, pleasant, NAD  EYES:   PERRL, EOMI, no icterus or pallor.  Conjunctivae and lids normal.   HENT:  Normocephalic.  External ears normal. Neck supple.   Respiratory: Normal respiratory effort.   Abdomen: Not obese  Skin: Warm, dry, no rashes.  Musculoskeletal: Gait is normal.  Moves all extremities well.    Extremities: Normal range of motion all extremities.   Neurological: No tremors, sensation grossly intact, CN2-12 intact.  Psych:  Affect/mood is normal, judgement is good, memory is intact, grooming is appropriate.    Assessment/Plan:     Nesha Cruz was seen today for paperwork.    Diagnoses and all orders for this visit:    Graves disease    [employer] paperwork has been filled out for FMLA return to work with no restrictions.    Need for vaccination  -     Tdap Vaccine =>6YO IM  -     Hepatitis B Vaccine Adult 20+    Vaccines given " today.        No follow-ups on file.    Please note that this dictation was created using voice recognition software. I have made every reasonable attempt to correct obvious errors, but expect that there are errors of grammar and possible content that I did not discover before finalizing note.

## 2023-08-23 ENCOUNTER — OFFICE VISIT (OUTPATIENT)
Dept: MEDICAL GROUP | Facility: CLINIC | Age: 48
End: 2023-08-23
Payer: COMMERCIAL

## 2023-08-23 VITALS
HEART RATE: 70 BPM | HEIGHT: 64 IN | TEMPERATURE: 98.3 F | RESPIRATION RATE: 16 BRPM | BODY MASS INDEX: 29.02 KG/M2 | WEIGHT: 170 LBS | OXYGEN SATURATION: 99 % | SYSTOLIC BLOOD PRESSURE: 104 MMHG | DIASTOLIC BLOOD PRESSURE: 68 MMHG

## 2023-08-23 DIAGNOSIS — E05.00 GRAVES DISEASE: ICD-10-CM

## 2023-08-23 DIAGNOSIS — G89.29 CHRONIC LOW BACK PAIN, UNSPECIFIED BACK PAIN LATERALITY, UNSPECIFIED WHETHER SCIATICA PRESENT: ICD-10-CM

## 2023-08-23 DIAGNOSIS — M54.50 CHRONIC LOW BACK PAIN, UNSPECIFIED BACK PAIN LATERALITY, UNSPECIFIED WHETHER SCIATICA PRESENT: ICD-10-CM

## 2023-08-23 DIAGNOSIS — E05.90 HYPERTHYROIDISM: ICD-10-CM

## 2023-08-23 PROCEDURE — 99213 OFFICE O/P EST LOW 20 MIN: CPT | Performed by: PHYSICIAN ASSISTANT

## 2023-08-23 PROCEDURE — 3078F DIAST BP <80 MM HG: CPT | Performed by: PHYSICIAN ASSISTANT

## 2023-08-23 PROCEDURE — 3074F SYST BP LT 130 MM HG: CPT | Performed by: PHYSICIAN ASSISTANT

## 2023-08-23 ASSESSMENT — FIBROSIS 4 INDEX: FIB4 SCORE: 0.27

## 2023-08-23 NOTE — PROGRESS NOTES
cc:  paperwork    Subjective:     Nesha Mart is a 48 y.o. female presenting for paperwork      Patient presents to the office for paperwork.  Patient is returning to the office requesting further paperwork to be filled out.  Patient's employer is questioning whether patient can truly return to work without restrictions.  Patient had reported on 8- that she did not have any problems lifting anything greater than 10 pounds.  She also indicated that her Graves' disease was under better control which was contributing to her previous restrictions.  Most recent test results from 7- also show that thyroid is under control at this time.  Patient reported decrease in chest pain and palpitations as a result.  Patient now indicates that she was and is having back issues.  She states that [employer] then brought in the back issue.  However, she went on leave due to the hyperthyroidism.  At this time, patient still indicates that she is not having any palpitations from her thyroid or chest pain.  Her labs are still within normal range and she indicates that she can continue to lift greater than 10 pounds.    Review of systems:  See above.   Denies any symptoms unless previously indicated.        Current Outpatient Medications:     QUEtiapine (SEROQUEL) 25 MG Tab, Take 25 mg by mouth at bedtime., Disp: , Rfl:     propranolol CR (INDERAL LA) 80 MG CAPSULE SR 24 HR, Take 80 mg by mouth every day., Disp: , Rfl:     hydrOXYzine HCl (ATARAX) 25 MG Tab, TAKE 1-3 TABLETS BY MOUTH ONCE DAILY AT BEDTIME AS NEEDED FOR INSOMNIA AND ITCHING., Disp: , Rfl:     Cholecalciferol (VITAMIN D3) 125 MCG (5000 UT) Cap, Take 5,000 Units by mouth every day., Disp: , Rfl:     docusate sodium (COLACE) 100 MG Cap, Take 100 mg by mouth 2 times a day., Disp: , Rfl:     MAGNESIUM PO, Take 1 Tablet by mouth every day., Disp: , Rfl:     cyclobenzaprine (FLEXERIL) 10 mg Tab, Take 10 mg by mouth in the morning, at noon, and at bedtime., Disp: ,  "Rfl:     meloxicam (MOBIC) 15 MG tablet, Take 15 mg by mouth every day., Disp: , Rfl:     ondansetron (ZOFRAN ODT) 4 MG TABLET DISPERSIBLE, Take 1 Tablet by mouth every 8 hours as needed for Nausea/Vomiting., Disp: 6 Tablet, Rfl: 0    gabapentin (NEURONTIN) 300 MG Cap, Take 300 mg by mouth at bedtime., Disp: , Rfl:     escitalopram (LEXAPRO) 20 MG tablet, Take 20 mg by mouth every day., Disp: , Rfl:     pregabalin (LYRICA) 50 MG capsule, Take 1 Capsule by mouth every 12 hours. (Patient not taking: Reported on 8/9/2023), Disp: , Rfl:     metoprolol SR (TOPROL XL) 25 MG TABLET SR 24 HR, TAKE ONE-HALF TO ONE TABLET BY MOUTH DAILY TO CONTROL HEART RATE (Patient not taking: Reported on 8/9/2023), Disp: , Rfl:     atenolol (TENORMIN) 50 MG Tab, Take 1 Tablet by mouth every day. (Patient not taking: Reported on 8/9/2023), Disp: 30 Tablet, Rfl: 0    methimazole (TAPAZOLE) 10 MG Tab, Take 1 tablet by mouth 2 times a day for 2 weeks, THEN take 1 tablet daily after (Patient not taking: Reported on 8/14/2023), Disp: 60 Tablet, Rfl: 0    metaxalone (SKELAXIN) 800 MG Tab, Take 800 mg by mouth 3 times a day. (Patient not taking: Reported on 8/14/2023), Disp: , Rfl:     Allergies, past medical history, past surgical history, family history, social history reviewed and updated    Objective:     Vitals: /68 (BP Location: Left arm, Patient Position: Sitting, BP Cuff Size: Adult)   Pulse 70   Temp 36.8 °C (98.3 °F) (Temporal)   Resp 16   Ht 1.626 m (5' 4\")   Wt 77.1 kg (170 lb)   LMP 07/26/2023 (Within Days)   SpO2 99%   BMI 29.18 kg/m²   General: Alert, pleasant, NAD  EYES:   PERRL, EOMI, no icterus or pallor.  Conjunctivae and lids normal.   HENT:  Normocephalic.  External ears normal.   Neck supple.    Respiratory: Normal respiratory effort. .  Abdomen: Not obese  Skin: Warm, dry, no rashes.  Musculoskeletal: Gait is normal.  Moves all extremities well.    Extremities: Normal range of motion all extremities. "   Neurological: No tremors, sensation grossly intact,  CN2-12 intact.  Psych:  Affect/mood is normal, judgement is good, memory is intact, grooming is appropriate.    Assessment/Plan:     Nesha Cruz was seen today for paperwork.    Diagnoses and all orders for this visit:    Graves disease    Hyperthyroidism    Chronic low back pain, unspecified back pain laterality, unspecified whether sciatica present      Paperwork has been filled out indicating that patient is no longer having palpitations or chest pain and her thyroid labs have returned to normal.  Patient is able to lift greater than 10 pounds.  In regards to her thyroid, I do believe that she can return to work without restrictions.  If a separate issue is related to concerns regarding patient's functional capacity, this is a separate issue that we have not evaluated and will need to evaluate further.  Paperwork has been filled out and given to patient to take to her employer and we will scan a copy into the chart.      No follow-ups on file.    Please note that this dictation was created using voice recognition software. I have made every reasonable attempt to correct obvious errors, but expect that there are errors of grammar and possible content that I did not discover before finalizing note.

## 2023-09-13 ENCOUNTER — APPOINTMENT (OUTPATIENT)
Dept: RADIOLOGY | Facility: IMAGING CENTER | Age: 48
End: 2023-09-13
Attending: FAMILY MEDICINE
Payer: COMMERCIAL

## 2023-09-13 ENCOUNTER — HOSPITAL ENCOUNTER (OUTPATIENT)
Dept: LAB | Facility: MEDICAL CENTER | Age: 48
End: 2023-09-13
Attending: INTERNAL MEDICINE
Payer: COMMERCIAL

## 2023-09-13 ENCOUNTER — OFFICE VISIT (OUTPATIENT)
Dept: URGENT CARE | Facility: PHYSICIAN GROUP | Age: 48
End: 2023-09-13
Payer: COMMERCIAL

## 2023-09-13 VITALS
DIASTOLIC BLOOD PRESSURE: 88 MMHG | SYSTOLIC BLOOD PRESSURE: 122 MMHG | BODY MASS INDEX: 30.76 KG/M2 | HEART RATE: 78 BPM | TEMPERATURE: 97.8 F | HEIGHT: 63 IN | RESPIRATION RATE: 18 BRPM | WEIGHT: 173.6 LBS | OXYGEN SATURATION: 95 %

## 2023-09-13 DIAGNOSIS — M25.512 ACUTE PAIN OF LEFT SHOULDER: ICD-10-CM

## 2023-09-13 DIAGNOSIS — M25.473 ANKLE EDEMA: ICD-10-CM

## 2023-09-13 PROCEDURE — 84480 ASSAY TRIIODOTHYRONINE (T3): CPT

## 2023-09-13 PROCEDURE — 36415 COLL VENOUS BLD VENIPUNCTURE: CPT

## 2023-09-13 PROCEDURE — 85025 COMPLETE CBC W/AUTO DIFF WBC: CPT

## 2023-09-13 PROCEDURE — 73030 X-RAY EXAM OF SHOULDER: CPT | Mod: TC,FY,LT | Performed by: FAMILY MEDICINE

## 2023-09-13 PROCEDURE — 3079F DIAST BP 80-89 MM HG: CPT | Performed by: FAMILY MEDICINE

## 2023-09-13 PROCEDURE — 99214 OFFICE O/P EST MOD 30 MIN: CPT | Performed by: FAMILY MEDICINE

## 2023-09-13 PROCEDURE — 84439 ASSAY OF FREE THYROXINE: CPT

## 2023-09-13 PROCEDURE — 3074F SYST BP LT 130 MM HG: CPT | Performed by: FAMILY MEDICINE

## 2023-09-13 PROCEDURE — 84443 ASSAY THYROID STIM HORMONE: CPT

## 2023-09-13 RX ORDER — FUROSEMIDE 40 MG/1
40 TABLET ORAL DAILY
Qty: 5 TABLET | Refills: 0 | Status: SHIPPED | OUTPATIENT
Start: 2023-09-13 | End: 2023-09-18

## 2023-09-13 RX ORDER — KETOROLAC TROMETHAMINE 30 MG/ML
30 INJECTION, SOLUTION INTRAMUSCULAR; INTRAVENOUS ONCE
Status: COMPLETED | OUTPATIENT
Start: 2023-09-13 | End: 2023-09-13

## 2023-09-13 RX ADMIN — KETOROLAC TROMETHAMINE 30 MG: 30 INJECTION, SOLUTION INTRAMUSCULAR; INTRAVENOUS at 13:50

## 2023-09-13 ASSESSMENT — FIBROSIS 4 INDEX: FIB4 SCORE: 0.27

## 2023-09-13 ASSESSMENT — PAIN SCALES - GENERAL: PAINLEVEL: 5=MODERATE PAIN

## 2023-09-13 NOTE — PROGRESS NOTES
"Subjective     Nesha Mart is a 48 y.o. female who presents with Edema (Pitting edema on ankles. X 1 week. Has graves disease ) and Arm Pain (Left arm pain hard to raise it. )            1 week left shoulder pain. Moderate to severe. Worse with abduction. No trauma. No prior injury or surgery.     3 days bilateral ankle swelling. Works on feet. Improved slightly with elevation. No orthopnea. No CP. No shortness of breath.         Review of Systems   Constitutional:  Negative for malaise/fatigue and weight loss.   Eyes:  Negative for discharge and redness.   Gastrointestinal:  Negative for nausea and vomiting.   Musculoskeletal:  Negative for joint pain and myalgias.   Skin:  Negative for itching and rash.              Objective     /88   Pulse 78   Temp 36.6 °C (97.8 °F) (Temporal)   Resp 18   Ht 1.6 m (5' 3\")   Wt 78.7 kg (173 lb 9.6 oz)   SpO2 95%   BMI 30.75 kg/m²      Physical Exam  Constitutional:       General: She is not in acute distress.     Appearance: She is well-developed.   HENT:      Head: Normocephalic and atraumatic.   Eyes:      Conjunctiva/sclera: Conjunctivae normal.   Cardiovascular:      Rate and Rhythm: Normal rate and regular rhythm.      Heart sounds: Normal heart sounds. No murmur heard.  Pulmonary:      Effort: Pulmonary effort is normal.      Breath sounds: Normal breath sounds. No wheezing.   Musculoskeletal:      Right lower leg: Edema (1+ ankle) present.      Left lower leg: Edema (1+ ankle) present.      Comments: No calf swelling or cords.    Skin:     General: Skin is warm and dry.      Findings: No rash.   Neurological:      Mental Status: She is alert.                             Assessment & Plan      Xray: negative per radiology    eGFR 4/13/23 112  K+ 4/13/23  4.7    1. Acute pain of left shoulder  DX-SHOULDER 2+ LEFT    ketorolac (Toradol) injection 30 mg    Referral to Sports Medicine    Referral to Physical Therapy      2. Ankle edema  furosemide (LASIX) 40 " MG Tab        Differential diagnosis, natural history, supportive care, and indications for immediate follow-up were discussed.     F/u pcp and sports medicine

## 2023-09-13 NOTE — LETTER
September 13, 2023         Patient: Nesha Mart   YOB: 1975   Date of Visit: 9/13/2023           To Whom it May Concern:    Nesha Mart was seen in my clinic on 9/13/2023. Please excuse from work 9/14 through 9/16/2023. She may return to her next scheduled shift to full duty.     Sincerely,           Guy Orozco M.D.  Electronically Signed

## 2023-09-14 LAB
BASOPHILS # BLD AUTO: 1.1 % (ref 0–1.8)
BASOPHILS # BLD: 0.06 K/UL (ref 0–0.12)
EOSINOPHIL # BLD AUTO: 0.28 K/UL (ref 0–0.51)
EOSINOPHIL NFR BLD: 5 % (ref 0–6.9)
ERYTHROCYTE [DISTWIDTH] IN BLOOD BY AUTOMATED COUNT: 45.3 FL (ref 35.9–50)
HCT VFR BLD AUTO: 36.3 % (ref 37–47)
HGB BLD-MCNC: 12 G/DL (ref 12–16)
IMM GRANULOCYTES # BLD AUTO: 0 K/UL (ref 0–0.11)
IMM GRANULOCYTES NFR BLD AUTO: 0 % (ref 0–0.9)
LYMPHOCYTES # BLD AUTO: 2.33 K/UL (ref 1–4.8)
LYMPHOCYTES NFR BLD: 41.6 % (ref 22–41)
MCH RBC QN AUTO: 30.7 PG (ref 27–33)
MCHC RBC AUTO-ENTMCNC: 33.1 G/DL (ref 32.2–35.5)
MCV RBC AUTO: 92.8 FL (ref 81.4–97.8)
MONOCYTES # BLD AUTO: 0.38 K/UL (ref 0–0.85)
MONOCYTES NFR BLD AUTO: 6.8 % (ref 0–13.4)
NEUTROPHILS # BLD AUTO: 2.55 K/UL (ref 1.82–7.42)
NEUTROPHILS NFR BLD: 45.5 % (ref 44–72)
NRBC # BLD AUTO: 0 K/UL
NRBC BLD-RTO: 0 /100 WBC (ref 0–0.2)
PLATELET # BLD AUTO: 355 K/UL (ref 164–446)
PMV BLD AUTO: 9.8 FL (ref 9–12.9)
RBC # BLD AUTO: 3.91 M/UL (ref 4.2–5.4)
T3 SERPL-MCNC: 107 NG/DL (ref 60–181)
T4 FREE SERPL-MCNC: 1.18 NG/DL (ref 0.93–1.7)
TSH SERPL DL<=0.005 MIU/L-ACNC: 1.41 UIU/ML (ref 0.38–5.33)
WBC # BLD AUTO: 5.6 K/UL (ref 4.8–10.8)

## 2023-09-16 ASSESSMENT — ENCOUNTER SYMPTOMS
VOMITING: 0
MYALGIAS: 0
NAUSEA: 0
EYE REDNESS: 0
EYE DISCHARGE: 0
WEIGHT LOSS: 0

## 2023-10-17 DIAGNOSIS — M25.473 ANKLE EDEMA: ICD-10-CM

## 2024-01-09 ENCOUNTER — HOSPITAL ENCOUNTER (OUTPATIENT)
Dept: LAB | Facility: MEDICAL CENTER | Age: 49
End: 2024-01-09
Attending: INTERNAL MEDICINE
Payer: COMMERCIAL

## 2024-01-09 LAB
T3 SERPL-MCNC: 97.5 NG/DL (ref 60–181)
T4 FREE SERPL-MCNC: 1.12 NG/DL (ref 0.93–1.7)
TSH SERPL DL<=0.005 MIU/L-ACNC: 1.4 UIU/ML (ref 0.38–5.33)

## 2024-01-09 PROCEDURE — 84480 ASSAY TRIIODOTHYRONINE (T3): CPT

## 2024-01-09 PROCEDURE — 84443 ASSAY THYROID STIM HORMONE: CPT

## 2024-01-09 PROCEDURE — 36415 COLL VENOUS BLD VENIPUNCTURE: CPT

## 2024-01-09 PROCEDURE — 84439 ASSAY OF FREE THYROXINE: CPT

## 2024-01-15 RX ORDER — FUROSEMIDE 40 MG/1
40 TABLET ORAL DAILY
Qty: 5 TABLET | Refills: 0 | OUTPATIENT
Start: 2024-01-15

## 2024-03-11 ENCOUNTER — OFFICE VISIT (OUTPATIENT)
Dept: CARDIOLOGY | Facility: MEDICAL CENTER | Age: 49
End: 2024-03-11
Attending: INTERNAL MEDICINE
Payer: COMMERCIAL

## 2024-03-11 VITALS
HEIGHT: 63 IN | SYSTOLIC BLOOD PRESSURE: 90 MMHG | HEART RATE: 72 BPM | BODY MASS INDEX: 30.26 KG/M2 | RESPIRATION RATE: 14 BRPM | OXYGEN SATURATION: 95 % | DIASTOLIC BLOOD PRESSURE: 78 MMHG | WEIGHT: 170.8 LBS

## 2024-03-11 DIAGNOSIS — R00.0 TACHYCARDIA: ICD-10-CM

## 2024-03-11 DIAGNOSIS — G47.09 OTHER INSOMNIA: ICD-10-CM

## 2024-03-11 DIAGNOSIS — R07.89 OTHER CHEST PAIN: ICD-10-CM

## 2024-03-11 PROCEDURE — 99204 OFFICE O/P NEW MOD 45 MIN: CPT | Performed by: INTERNAL MEDICINE

## 2024-03-11 PROCEDURE — 3074F SYST BP LT 130 MM HG: CPT | Performed by: INTERNAL MEDICINE

## 2024-03-11 PROCEDURE — 93005 ELECTROCARDIOGRAM TRACING: CPT | Performed by: INTERNAL MEDICINE

## 2024-03-11 PROCEDURE — 3078F DIAST BP <80 MM HG: CPT | Performed by: INTERNAL MEDICINE

## 2024-03-11 PROCEDURE — 99213 OFFICE O/P EST LOW 20 MIN: CPT | Performed by: INTERNAL MEDICINE

## 2024-03-11 RX ORDER — NICOTINE 14MG/24HR
PATCH, TRANSDERMAL 24 HOURS TRANSDERMAL
COMMUNITY

## 2024-03-11 RX ORDER — GRAPE SEED EXT/BIOFLAV,CITRUS 50MG-250MG
67000 CAPSULE ORAL
COMMUNITY

## 2024-03-11 ASSESSMENT — FIBROSIS 4 INDEX: FIB4 SCORE: 0.35

## 2024-03-11 NOTE — ASSESSMENT & PLAN NOTE
Noted history of insomnia and obstructive sleep apnea.  She will need to reach out to her primary sleep medicine specialist regarding other options regarding treating her sleep apnea.  If this is predominantly obstructive sleep apnea she may be a candidate for inspire sleep apnea device as opposed to utilizing a CPAP/BiPAP.  I will defer management to her primary sleep medicine specialist

## 2024-03-11 NOTE — PROGRESS NOTES
[facility]    PatientName:Nesha Cruz DennisDate: 3/11/2024  :1975    49 y.o.PCP:Génesis Sanchez P.A.-C.  MRN:[id number]          Problems and Plans    Tachycardia  Regarding her tachycardia symptoms etiology is not entirely clear however she does currently take propranolol therapy and at this time based on EKG has a well-controlled heart rate.  She is scheduled to undergo an echocardiogram within the next few weeks.  This will be performed and further recommendations to follow pending.    Other insomnia  Noted history of insomnia and obstructive sleep apnea.  She will need to reach out to her primary sleep medicine specialist regarding other options regarding treating her sleep apnea.  If this is predominantly obstructive sleep apnea she may be a candidate for inspire sleep apnea device as opposed to utilizing a CPAP/BiPAP.  I will defer management to her primary sleep medicine specialist    Other chest pain  Clinically, she is doing fair but does have atypical chest pain symptoms.  At this time I recommended that she undergo a stress echocardiogram to further assess overall functional capacity as well as assess for possible ischemia.  Patient is amenable to this current course of action    Return in about 2 months (around 2024).      Encounter    Reason for Visit / Chief Complaint: Chest pain    HPI    49-year-old female with noted history of intermittent tachycardia, chronic back pain secondary to degenerative disc disease, chronic cervical pain secondary to degenerative disc disease, obstructive sleep apnea with mask noncompliance secondary to septal deviation presents in consultation for evaluation of heart rate variability and occasional chest pain.    She been her usual state of health but over the course of the last year has had brief periods where she has been noted to have slurred speech.  This was witnessed by her endocrinologist at the time and ultimately  she recalls not having significant workup following this.  She has noticed some finger numbness and leg cramping that has largely been going on for roughly 6 months she has ongoing fatigue and notes recently that her sleep apnea machine had to be turned back in due to low compliance.  She currently works at [employer] and is on her feet a significant period of the time.    No prior cardiovascular workup.  Past Medical History  History reviewed. No pertinent past medical history.  Past Surgical History  Past Surgical History:   Procedure Laterality Date    TUBAL LIGATION       Social History  Social History     Socioeconomic History    Marital status: Single     Spouse name: Not on file    Number of children: Not on file    Years of education: Not on file    Highest education level: Not on file   Occupational History    Not on file   Tobacco Use    Smoking status: Former     Types: Cigarettes    Smokeless tobacco: Never   Vaping Use    Vaping Use: Every day    Substances: Nicotine, Flavoring    Devices: Refillable tank   Substance and Sexual Activity    Alcohol use: Not Currently     Comment: occasional    Drug use: Never    Sexual activity: Not on file   Other Topics Concern    Not on file   Social History Narrative    Not on file     Social Determinants of Health     Financial Resource Strain: Not on file   Food Insecurity: Not on file   Transportation Needs: Not on file   Physical Activity: Not on file   Stress: Not on file   Social Connections: Not on file   Intimate Partner Violence: Not on file   Housing Stability: Not on file     Past Family History  Family History   Problem Relation Age of Onset    Thyroid Mother         hypothyroid    Thyroid Sister         hypothyroid     Medication(s)    Current Outpatient Medications:     Probiotic, Take  by mouth., Taking    Black Cohosh, Take 67,000 mg by mouth., Taking    QUEtiapine, 25 mg, Oral, QHS, Taking    propranolol CR, 80 mg, Oral, DAILY, Taking    hydrOXYzine HCl,  "TAKE 1-3 TABLETS BY MOUTH ONCE DAILY AT BEDTIME AS NEEDED FOR INSOMNIA AND ITCHING., PRN    MAGNESIUM PO, 1 Tablet, Oral, DAILY, Taking    cyclobenzaprine, 10 mg, Oral, TID, Taking    meloxicam, 15 mg, Oral, DAILY, Taking    ondansetron, 4 mg, Oral, Q8HRS PRN, PRN    gabapentin, 300 mg, Oral, QHS, Taking    escitalopram, 20 mg, Oral, DAILY, Taking  Allergies  Lyrica    Review of Systems    A comprehensive 10 system review was conducted and is negative except as noted above in the HPI or here.      Vital Signs  BP 90/78 (BP Location: Left arm, Patient Position: Sitting, BP Cuff Size: Adult)   Pulse 72   Resp 14   Ht 1.6 m (5' 3\")   Wt 77.5 kg (170 lb 12.8 oz)   SpO2 95%   BMI 30.26 kg/m²     Physical Exam  Vitals and nursing note reviewed.   Constitutional:       Appearance: Normal appearance.   HENT:      Head: Normocephalic and atraumatic.      Nose: Nose normal.      Mouth/Throat:      Mouth: Mucous membranes are moist.      Pharynx: Oropharynx is clear.   Eyes:      Pupils: Pupils are equal, round, and reactive to light.   Cardiovascular:      Rate and Rhythm: Normal rate and regular rhythm.      Heart sounds: No murmur heard.     No friction rub. No gallop.   Pulmonary:      Effort: Pulmonary effort is normal.      Breath sounds: Normal breath sounds.   Abdominal:      General: Bowel sounds are normal.      Palpations: Abdomen is soft.   Musculoskeletal:         General: Normal range of motion.      Cervical back: Normal range of motion and neck supple.   Skin:     General: Skin is warm and dry.   Neurological:      General: No focal deficit present.      Mental Status: She is alert and oriented to person, place, and time. Mental status is at baseline.   Psychiatric:         Mood and Affect: Mood normal.         Behavior: Behavior normal.         Thought Content: Thought content normal.         Judgment: Judgment normal.         Lab Results   Component Value Date/Time    TSHULTRASEN 1.400 01/09/2024 1328 "        Lab Results   Component Value Date/Time    FREET4 1.12 01/09/2024 1328        Lab Results   Component Value Date/Time    HBA1C 5.3 01/04/2023 01:56 PM       Lab Results   Component Value Date/Time    CHOLSTRLTOT 157 09/20/2022 10:22 AM    LDL 93 09/20/2022 10:22 AM    HDL 49 09/20/2022 10:22 AM    TRIGLYCERIDE 76 09/20/2022 10:22 AM         Lab Results   Component Value Date/Time    SODIUM 139 04/13/2023 02:43 PM    POTASSIUM 4.7 04/13/2023 02:43 PM    CHLORIDE 105 04/13/2023 02:43 PM    CO2 24 04/13/2023 02:43 PM    GLUCOSE 100 (H) 04/13/2023 02:43 PM    BUN 18 04/13/2023 02:43 PM    CREATININE 0.57 04/13/2023 02:43 PM       Lab Results   Component Value Date/Time    ALKPHOSPHAT 86 04/13/2023 02:43 PM    ASTSGOT 10 (L) 07/27/2023 11:15 AM    ALTSGPT 16 07/27/2023 11:15 AM    TBILIRUBIN 0.4 07/27/2023 11:15 AM         Imaging  EKG demonstrates sinus rhythm with nonspecific ST-T wave changes.  I reviewed interpret his EKG.  Findings are discussed with the patient    Echocardiogram  Pending    Total patient time was estimated to be 45 minutes consisting of chart review, direct patient interaction, medication renewal, plan development and overall communication with the cardiovascular team.        Electronically signed by:   Pierre Fan DO, MPH  [facility]    Portions of this note were completed using voice recognition software (Dragon Naturally speaking software) . Occasional transcription errors may have escaped proof reading. I have made every reasonable attempt to correct obvious errors, but I expect that there are errors of grammar and possibly content that I did not discover before finalizing the note.

## 2024-03-11 NOTE — ASSESSMENT & PLAN NOTE
Clinically, she is doing fair but does have atypical chest pain symptoms.  At this time I recommended that she undergo a stress echocardiogram to further assess overall functional capacity as well as assess for possible ischemia.  Patient is amenable to this current course of action

## 2024-03-11 NOTE — ASSESSMENT & PLAN NOTE
Regarding her tachycardia symptoms etiology is not entirely clear however she does currently take propranolol therapy and at this time based on EKG has a well-controlled heart rate.  She is scheduled to undergo an echocardiogram within the next few weeks.  This will be performed and further recommendations to follow pending.

## 2024-03-11 NOTE — PATIENT INSTRUCTIONS
Follow up in 2 months  Check a stress echocardiogram  Echocardiogram at [place] area  Continue current medications  Call with questions.

## 2024-03-13 LAB — EKG IMPRESSION: NORMAL

## 2024-03-13 PROCEDURE — 93010 ELECTROCARDIOGRAM REPORT: CPT | Performed by: INTERNAL MEDICINE

## 2024-03-28 ENCOUNTER — HOSPITAL ENCOUNTER (OUTPATIENT)
Dept: CARDIOLOGY | Facility: MEDICAL CENTER | Age: 49
End: 2024-03-28
Attending: INTERNAL MEDICINE
Payer: COMMERCIAL

## 2024-03-28 DIAGNOSIS — R00.0 TACHYCARDIA: ICD-10-CM

## 2024-03-28 PROCEDURE — 93017 CV STRESS TEST TRACING ONLY: CPT

## 2024-06-03 ENCOUNTER — OFFICE VISIT (OUTPATIENT)
Dept: CARDIOLOGY | Facility: MEDICAL CENTER | Age: 49
End: 2024-06-03
Attending: INTERNAL MEDICINE
Payer: COMMERCIAL

## 2024-06-03 VITALS
BODY MASS INDEX: 30.02 KG/M2 | SYSTOLIC BLOOD PRESSURE: 106 MMHG | HEART RATE: 64 BPM | RESPIRATION RATE: 16 BRPM | WEIGHT: 169.4 LBS | DIASTOLIC BLOOD PRESSURE: 72 MMHG | OXYGEN SATURATION: 99 % | HEIGHT: 63 IN

## 2024-06-03 DIAGNOSIS — R00.0 TACHYCARDIA: ICD-10-CM

## 2024-06-03 DIAGNOSIS — R07.89 OTHER CHEST PAIN: ICD-10-CM

## 2024-06-03 PROCEDURE — 99213 OFFICE O/P EST LOW 20 MIN: CPT | Performed by: INTERNAL MEDICINE

## 2024-06-03 PROCEDURE — 3074F SYST BP LT 130 MM HG: CPT | Performed by: INTERNAL MEDICINE

## 2024-06-03 PROCEDURE — 3078F DIAST BP <80 MM HG: CPT | Performed by: INTERNAL MEDICINE

## 2024-06-03 ASSESSMENT — FIBROSIS 4 INDEX: FIB4 SCORE: 0.35

## 2024-06-03 NOTE — ASSESSMENT & PLAN NOTE
Regarding her tachycardia symptoms etiology is not entirely clear however she does currently take propranolol therapy and at this time based on EKG has a well-controlled heart rate.  No changes were made to her medical therapy at this time

## 2024-06-03 NOTE — PROGRESS NOTES
[facility]    PatientName:Nesha Cruz DennisDate: 6/3/2024  :1975    49 y.o.PCP:SAMARA Louis  MRN:[id number]        Problems and Plans    Tachycardia  Regarding her tachycardia symptoms etiology is not entirely clear however she does currently take propranolol therapy and at this time based on EKG has a well-controlled heart rate.  No changes were made to her medical therapy at this time    Other chest pain  At this time her chest pain is likely noncardiac in nature.  I recommended that she continue with ongoing supportive measures regarding her chest wall tenderness.  This may be more related to intercostal muscles and may benefit from a trigger point injection.  She will work closely with her primary care provider at this time    Return if symptoms worsen or fail to improve.      Encounter    Reason for Visit / Chief Complaint: Noncardiac chest pain    HPI    49-year-old female with noted history of intermittent tachycardia, chronic back pain secondary to degenerative disc disease, chronic cervical pain secondary to degenerative disc disease, obstructive sleep apnea with mask noncompliance secondary to septal deviation presents in clinic for evaluation of heart rate variability and occasional chest pain as well as recent cardiovascular testing.    Currently, she notes she is feeling well and not having further significant exertional chest pains.  She does still have some point tenderness in the chest wall that has not significantly improved with conservative measures.  She is working closely with her primary nurse practitioner.    Stress echocardiogram performed on 2021 electrical evidence of infarct or ischemia appropriate heart rate and blood pressure response to exercise noted lightheadedness at peak exercise improved with passage of time with overall fair exercise tolerance, resting echocardiographic images without wall motion abnormalities and repeat  augmentation of all wall segments during exercise.        .   Past Medical History  History reviewed. No pertinent past medical history.  Past Surgical History  Past Surgical History:   Procedure Laterality Date    TUBAL LIGATION       Social History  Social History     Socioeconomic History    Marital status: Single     Spouse name: Not on file    Number of children: Not on file    Years of education: Not on file    Highest education level: Not on file   Occupational History    Not on file   Tobacco Use    Smoking status: Former     Types: Cigarettes    Smokeless tobacco: Never   Vaping Use    Vaping status: Every Day    Substances: Nicotine, Flavoring    Devices: Refillable tank   Substance and Sexual Activity    Alcohol use: Not Currently     Comment: occasional    Drug use: Never    Sexual activity: Not on file   Other Topics Concern    Not on file   Social History Narrative    Not on file     Social Determinants of Health     Financial Resource Strain: Not on file   Food Insecurity: Not on file   Transportation Needs: Not on file   Physical Activity: Not on file   Stress: Not on file   Social Connections: Not on file   Intimate Partner Violence: Not on file   Housing Stability: Not on file     Past Family History  Family History   Problem Relation Age of Onset    Thyroid Mother         hypothyroid    Thyroid Sister         hypothyroid     Medication(s)    Current Outpatient Medications:     Probiotic, Take  by mouth., Taking    Black Cohosh, Take 67,000 mg by mouth., Taking    QUEtiapine, 25 mg, Oral, QHS, Taking    propranolol CR, 80 mg, Oral, DAILY, Taking    hydrOXYzine HCl, TAKE 1-3 TABLETS BY MOUTH ONCE DAILY AT BEDTIME AS NEEDED FOR INSOMNIA AND ITCHING., Taking    MAGNESIUM PO, 1 Tablet, Oral, DAILY, Taking    cyclobenzaprine, 10 mg, Oral, TID, Taking    meloxicam, 15 mg, Oral, DAILY, Taking    ondansetron, 4 mg, Oral, Q8HRS PRN, PRN    gabapentin, 300 mg, Oral, QHS, Taking    escitalopram, 20 mg, Oral,  "DAILY, Taking  Allergies  Lyrica    Review of Systems    A comprehensive 10 system review was conducted and is negative except as noted above in the HPI or here.      Vital Signs  /72 (BP Location: Left arm, Patient Position: Sitting, BP Cuff Size: Adult)   Pulse 64   Resp 16   Ht 1.6 m (5' 3\")   Wt 76.8 kg (169 lb 6.4 oz)   SpO2 99%   BMI 30.01 kg/m²     Physical Exam  Vitals and nursing note reviewed.   Constitutional:       Appearance: Normal appearance.   HENT:      Head: Normocephalic and atraumatic.      Nose: Nose normal.      Mouth/Throat:      Mouth: Mucous membranes are moist.      Pharynx: Oropharynx is clear.   Eyes:      Pupils: Pupils are equal, round, and reactive to light.   Cardiovascular:      Rate and Rhythm: Normal rate and regular rhythm.      Heart sounds: No murmur heard.     No friction rub. No gallop.      Comments: Left third and fourth intercostal space reproducible chest discomfort  Pulmonary:      Effort: Pulmonary effort is normal.      Breath sounds: Normal breath sounds.   Abdominal:      General: Bowel sounds are normal.      Palpations: Abdomen is soft.   Musculoskeletal:         General: Normal range of motion.      Cervical back: Normal range of motion and neck supple.      Right lower leg: No edema.      Left lower leg: No edema.   Skin:     General: Skin is warm and dry.   Neurological:      General: No focal deficit present.      Mental Status: She is alert and oriented to person, place, and time. Mental status is at baseline.   Psychiatric:         Mood and Affect: Mood normal.         Behavior: Behavior normal.         Thought Content: Thought content normal.         Judgment: Judgment normal.         Lab Results   Component Value Date/Time    TSHULTRASEN 1.400 01/09/2024 1328        Lab Results   Component Value Date/Time    FREET4 1.12 01/09/2024 1328        Lab Results   Component Value Date/Time    HBA1C 5.3 01/04/2023 01:56 PM       Lab Results   Component " Value Date/Time    CHOLSTRLTOT 157 09/20/2022 10:22 AM    LDL 93 09/20/2022 10:22 AM    HDL 49 09/20/2022 10:22 AM    TRIGLYCERIDE 76 09/20/2022 10:22 AM         Lab Results   Component Value Date/Time    SODIUM 139 04/13/2023 02:43 PM    POTASSIUM 4.7 04/13/2023 02:43 PM    CHLORIDE 105 04/13/2023 02:43 PM    CO2 24 04/13/2023 02:43 PM    GLUCOSE 100 (H) 04/13/2023 02:43 PM    BUN 18 04/13/2023 02:43 PM    CREATININE 0.57 04/13/2023 02:43 PM       Lab Results   Component Value Date/Time    ALKPHOSPHAT 86 04/13/2023 02:43 PM    ASTSGOT 10 (L) 07/27/2023 11:15 AM    ALTSGPT 16 07/27/2023 11:15 AM    TBILIRUBIN 0.4 07/27/2023 11:15 AM             Total patient time was estimated to be 20 minutes consisting of chart review, direct patient interaction, medication renewal, plan development and overall communication with the cardiovascular team.        Electronically signed by:   Pierre Fan DO, MPH  [facility]    Portions of this note were completed using voice recognition software (Dragon Naturally speaking software) . Occasional transcription errors may have escaped proof reading. I have made every reasonable attempt to correct obvious errors, but I expect that there are errors of grammar and possibly content that I did not discover before finalizing the note.

## 2024-06-03 NOTE — ASSESSMENT & PLAN NOTE
At this time her chest pain is likely noncardiac in nature.  I recommended that she continue with ongoing supportive measures regarding her chest wall tenderness.  This may be more related to intercostal muscles and may benefit from a trigger point injection.  She will work closely with her primary care provider at this time

## 2024-06-18 ENCOUNTER — OFFICE VISIT (OUTPATIENT)
Dept: URGENT CARE | Facility: PHYSICIAN GROUP | Age: 49
End: 2024-06-18
Payer: COMMERCIAL

## 2024-06-18 ENCOUNTER — HOSPITAL ENCOUNTER (OUTPATIENT)
Dept: LAB | Facility: MEDICAL CENTER | Age: 49
End: 2024-06-18
Attending: INTERNAL MEDICINE
Payer: COMMERCIAL

## 2024-06-18 ENCOUNTER — APPOINTMENT (OUTPATIENT)
Dept: RADIOLOGY | Facility: MEDICAL CENTER | Age: 49
End: 2024-06-18
Attending: STUDENT IN AN ORGANIZED HEALTH CARE EDUCATION/TRAINING PROGRAM
Payer: COMMERCIAL

## 2024-06-18 ENCOUNTER — HOSPITAL ENCOUNTER (EMERGENCY)
Facility: MEDICAL CENTER | Age: 49
End: 2024-06-19
Attending: STUDENT IN AN ORGANIZED HEALTH CARE EDUCATION/TRAINING PROGRAM
Payer: COMMERCIAL

## 2024-06-18 VITALS
TEMPERATURE: 97.8 F | RESPIRATION RATE: 14 BRPM | WEIGHT: 168.65 LBS | SYSTOLIC BLOOD PRESSURE: 119 MMHG | OXYGEN SATURATION: 97 % | HEART RATE: 67 BPM | DIASTOLIC BLOOD PRESSURE: 78 MMHG | BODY MASS INDEX: 29.88 KG/M2

## 2024-06-18 VITALS
HEART RATE: 78 BPM | HEIGHT: 63 IN | WEIGHT: 172 LBS | SYSTOLIC BLOOD PRESSURE: 120 MMHG | DIASTOLIC BLOOD PRESSURE: 68 MMHG | OXYGEN SATURATION: 100 % | RESPIRATION RATE: 16 BRPM | BODY MASS INDEX: 30.48 KG/M2 | TEMPERATURE: 97.4 F

## 2024-06-18 DIAGNOSIS — M54.50 ACUTE EXACERBATION OF CHRONIC LOW BACK PAIN: ICD-10-CM

## 2024-06-18 DIAGNOSIS — R15.9 INCONTINENCE OF FECES, UNSPECIFIED FECAL INCONTINENCE TYPE: ICD-10-CM

## 2024-06-18 DIAGNOSIS — M54.2 NECK PAIN: ICD-10-CM

## 2024-06-18 DIAGNOSIS — M54.9 EXACERBATION OF CHRONIC BACK PAIN: ICD-10-CM

## 2024-06-18 DIAGNOSIS — G89.29 ACUTE EXACERBATION OF CHRONIC LOW BACK PAIN: ICD-10-CM

## 2024-06-18 DIAGNOSIS — G89.29 EXACERBATION OF CHRONIC BACK PAIN: ICD-10-CM

## 2024-06-18 LAB
ALBUMIN SERPL BCP-MCNC: 4.6 G/DL (ref 3.2–4.9)
ALBUMIN/GLOB SERPL: 1.2 G/DL
ALP SERPL-CCNC: 112 U/L (ref 30–99)
ALT SERPL-CCNC: 17 U/L (ref 2–50)
ANION GAP SERPL CALC-SCNC: 12 MMOL/L (ref 7–16)
AST SERPL-CCNC: 25 U/L (ref 12–45)
B-HCG SERPL-ACNC: 1 MIU/ML (ref 0–5)
BASOPHILS # BLD AUTO: 0.8 % (ref 0–1.8)
BASOPHILS # BLD: 0.06 K/UL (ref 0–0.12)
BILIRUB SERPL-MCNC: 0.2 MG/DL (ref 0.1–1.5)
BUN SERPL-MCNC: 16 MG/DL (ref 8–22)
CALCIUM ALBUM COR SERPL-MCNC: 9.1 MG/DL (ref 8.5–10.5)
CALCIUM SERPL-MCNC: 9.6 MG/DL (ref 8.5–10.5)
CHLORIDE SERPL-SCNC: 100 MMOL/L (ref 96–112)
CO2 SERPL-SCNC: 23 MMOL/L (ref 20–33)
CREAT SERPL-MCNC: 0.67 MG/DL (ref 0.5–1.4)
EOSINOPHIL # BLD AUTO: 0.15 K/UL (ref 0–0.51)
EOSINOPHIL NFR BLD: 2.1 % (ref 0–6.9)
ERYTHROCYTE [DISTWIDTH] IN BLOOD BY AUTOMATED COUNT: 43.4 FL (ref 35.9–50)
GFR SERPLBLD CREATININE-BSD FMLA CKD-EPI: 107 ML/MIN/1.73 M 2
GLOBULIN SER CALC-MCNC: 3.9 G/DL (ref 1.9–3.5)
GLUCOSE SERPL-MCNC: 95 MG/DL (ref 65–99)
HCT VFR BLD AUTO: 40.1 % (ref 37–47)
HGB BLD-MCNC: 13.6 G/DL (ref 12–16)
IMM GRANULOCYTES # BLD AUTO: 0.03 K/UL (ref 0–0.11)
IMM GRANULOCYTES NFR BLD AUTO: 0.4 % (ref 0–0.9)
LYMPHOCYTES # BLD AUTO: 2.21 K/UL (ref 1–4.8)
LYMPHOCYTES NFR BLD: 31 % (ref 22–41)
MCH RBC QN AUTO: 30.6 PG (ref 27–33)
MCHC RBC AUTO-ENTMCNC: 33.9 G/DL (ref 32.2–35.5)
MCV RBC AUTO: 90.3 FL (ref 81.4–97.8)
MONOCYTES # BLD AUTO: 0.36 K/UL (ref 0–0.85)
MONOCYTES NFR BLD AUTO: 5 % (ref 0–13.4)
NEUTROPHILS # BLD AUTO: 4.33 K/UL (ref 1.82–7.42)
NEUTROPHILS NFR BLD: 60.7 % (ref 44–72)
NRBC # BLD AUTO: 0 K/UL
NRBC BLD-RTO: 0 /100 WBC (ref 0–0.2)
PLATELET # BLD AUTO: 413 K/UL (ref 164–446)
PMV BLD AUTO: 9.1 FL (ref 9–12.9)
POTASSIUM SERPL-SCNC: 3.8 MMOL/L (ref 3.6–5.5)
PROT SERPL-MCNC: 8.5 G/DL (ref 6–8.2)
RBC # BLD AUTO: 4.44 M/UL (ref 4.2–5.4)
SODIUM SERPL-SCNC: 135 MMOL/L (ref 135–145)
T4 FREE SERPL-MCNC: 1.44 NG/DL (ref 0.93–1.7)
TSH SERPL DL<=0.005 MIU/L-ACNC: 1.78 UIU/ML (ref 0.38–5.33)
WBC # BLD AUTO: 7.1 K/UL (ref 4.8–10.8)

## 2024-06-18 PROCEDURE — 85025 COMPLETE CBC W/AUTO DIFF WBC: CPT

## 2024-06-18 PROCEDURE — 84443 ASSAY THYROID STIM HORMONE: CPT

## 2024-06-18 PROCEDURE — 99284 EMERGENCY DEPT VISIT MOD MDM: CPT

## 2024-06-18 PROCEDURE — 80053 COMPREHEN METABOLIC PANEL: CPT

## 2024-06-18 PROCEDURE — 3078F DIAST BP <80 MM HG: CPT | Performed by: PHYSICIAN ASSISTANT

## 2024-06-18 PROCEDURE — 84702 CHORIONIC GONADOTROPIN TEST: CPT

## 2024-06-18 PROCEDURE — 99215 OFFICE O/P EST HI 40 MIN: CPT | Performed by: PHYSICIAN ASSISTANT

## 2024-06-18 PROCEDURE — 36415 COLL VENOUS BLD VENIPUNCTURE: CPT

## 2024-06-18 PROCEDURE — 3074F SYST BP LT 130 MM HG: CPT | Performed by: PHYSICIAN ASSISTANT

## 2024-06-18 PROCEDURE — A9270 NON-COVERED ITEM OR SERVICE: HCPCS | Mod: UD | Performed by: STUDENT IN AN ORGANIZED HEALTH CARE EDUCATION/TRAINING PROGRAM

## 2024-06-18 PROCEDURE — 72148 MRI LUMBAR SPINE W/O DYE: CPT

## 2024-06-18 PROCEDURE — 700102 HCHG RX REV CODE 250 W/ 637 OVERRIDE(OP): Mod: UD | Performed by: STUDENT IN AN ORGANIZED HEALTH CARE EDUCATION/TRAINING PROGRAM

## 2024-06-18 PROCEDURE — 84439 ASSAY OF FREE THYROXINE: CPT

## 2024-06-18 PROCEDURE — 700101 HCHG RX REV CODE 250: Mod: UD | Performed by: STUDENT IN AN ORGANIZED HEALTH CARE EDUCATION/TRAINING PROGRAM

## 2024-06-18 RX ORDER — DIAZEPAM 2 MG/1
2 TABLET ORAL ONCE
Status: COMPLETED | OUTPATIENT
Start: 2024-06-18 | End: 2024-06-18

## 2024-06-18 RX ORDER — LIDOCAINE 4 G/G
1 PATCH TOPICAL EVERY 24 HOURS
Status: DISCONTINUED | OUTPATIENT
Start: 2024-06-18 | End: 2024-06-19 | Stop reason: HOSPADM

## 2024-06-18 RX ORDER — LIDOCAINE 50 MG/G
1 PATCH TOPICAL EVERY 24 HOURS
Qty: 10 PATCH | Refills: 0 | Status: SHIPPED | OUTPATIENT
Start: 2024-06-18

## 2024-06-18 RX ORDER — ACETAMINOPHEN 325 MG/1
650 TABLET ORAL ONCE
Status: COMPLETED | OUTPATIENT
Start: 2024-06-18 | End: 2024-06-18

## 2024-06-18 RX ORDER — GABAPENTIN 300 MG/1
300 CAPSULE ORAL ONCE
Status: COMPLETED | OUTPATIENT
Start: 2024-06-18 | End: 2024-06-18

## 2024-06-18 RX ADMIN — GABAPENTIN 300 MG: 300 CAPSULE ORAL at 21:33

## 2024-06-18 RX ADMIN — ACETAMINOPHEN 650 MG: 325 TABLET, FILM COATED ORAL at 20:29

## 2024-06-18 RX ADMIN — LIDOCAINE 1 PATCH: 4 PATCH TOPICAL at 20:29

## 2024-06-18 RX ADMIN — DIAZEPAM 2 MG: 2 TABLET ORAL at 20:46

## 2024-06-18 ASSESSMENT — ENCOUNTER SYMPTOMS
ROS GI COMMENTS: FECAL INCONTINENCE
NAUSEA: 0
SENSORY CHANGE: 1
DIARRHEA: 0
PALPITATIONS: 0
COUGH: 0
WEAKNESS: 0
VOMITING: 0
CHILLS: 0
FOCAL WEAKNESS: 0
BACK PAIN: 1
NECK PAIN: 1
SHORTNESS OF BREATH: 0
MYALGIAS: 1
TINGLING: 1
FEVER: 0

## 2024-06-18 ASSESSMENT — PAIN DESCRIPTION - PAIN TYPE
TYPE: CHRONIC PAIN;ACUTE PAIN
TYPE: ACUTE PAIN

## 2024-06-18 ASSESSMENT — FIBROSIS 4 INDEX
FIB4 SCORE: 0.35
FIB4 SCORE: 0.35

## 2024-06-18 NOTE — ED TRIAGE NOTES
Ambulates to triage  Chief Complaint   Patient presents with    Back Pain     Chronic back pain, getting worse, and for the last 2 weeks having bowel incontinence. No recent trauma     Has not seen any specialist for this, takes muscle relaxers and neurontin and has very litlte relief.     No

## 2024-06-18 NOTE — PROGRESS NOTES
"Subjective     Nesha Mart is a 49 y.o. female who presents with Back Pain (Upper/lower )            Patient with long history of chronic lower back and neck pain. Over the past 2 weeks she has noticed progressively worsening pain. Pain is located in anterior neck and lumbar back. Pain radiates down left leg. Patient describes a new \"warm sensation\" that goes down left buttock into left thigh. She has never felt this sensation before. She also reports some anal numbness and having some bowel incontinence without knowing. She denies any recent injury. No urinary incontinence. Her chronic pain medicine (Gabapentin and Flexeril) are not working.        No past medical history on file.    Past Surgical History:   Procedure Laterality Date    TUBAL LIGATION         Family History   Problem Relation Age of Onset    Thyroid Mother         hypothyroid    Thyroid Sister         hypothyroid       Allergies   Allergen Reactions    Lyrica Palpitations     Pt states she \"felt weird\" and noticed elevated HR         Medications, Allergies, and current problem list reviewed today in Epic      Review of Systems   Constitutional:  Negative for chills, fever and malaise/fatigue.   Respiratory:  Negative for cough and shortness of breath.    Cardiovascular:  Negative for chest pain, palpitations and leg swelling.   Gastrointestinal:  Negative for diarrhea, nausea and vomiting.        Fecal incontinence    Musculoskeletal:  Positive for back pain, myalgias and neck pain.   Neurological:  Positive for tingling and sensory change. Negative for focal weakness and weakness.     All other systems reviewed and are negative.            Objective     /68 (BP Location: Right arm, Patient Position: Sitting, BP Cuff Size: Small adult)   Pulse 78   Temp 36.3 °C (97.4 °F) (Temporal)   Resp 16   Ht 1.6 m (5' 3\")   Wt 78 kg (172 lb)   SpO2 100%   BMI 30.47 kg/m²      Physical Exam  Constitutional:       General: She is not in acute " distress.     Appearance: She is not ill-appearing.   HENT:      Head: Normocephalic and atraumatic.   Eyes:      Conjunctiva/sclera: Conjunctivae normal.   Cardiovascular:      Rate and Rhythm: Normal rate and regular rhythm.   Pulmonary:      Effort: Pulmonary effort is normal. No respiratory distress.      Breath sounds: No stridor. No wheezing.   Musculoskeletal:      Cervical back: Spasms and tenderness present. Decreased range of motion.      Lumbar back: Spasms and tenderness present. Decreased range of motion.   Skin:     General: Skin is warm and dry.   Neurological:      General: No focal deficit present.      Mental Status: She is alert and oriented to person, place, and time.   Psychiatric:         Mood and Affect: Mood normal.         Behavior: Behavior normal.         Thought Content: Thought content normal.         Judgment: Judgment normal.                             Assessment & Plan        1. Exacerbation of chronic back pain    2. Incontinence of feces, unspecified fecal incontinence type      3. Neck pain       Patient demonstrates red flag of anal numbness and fecal incontinence.    At this time, I feel the patient requires a higher level of care including closer monitoring, stat lab work and/or imaging for further evaluation due to these complaints.   This has been discussed with the patient and they state agreement and understanding. The patient is in no acute distress upon clinic departure and will go directly to ED without delay.    Risks of not going including death discussed    Patient is stable upon discharge.    Transfer Center notified. Patient is going to [facility] via [facility]    Rose Mary Argueta P.A.-C.

## 2024-06-19 NOTE — ED NOTES
Patient Identifiers verified; patient ambulated to room with a steady gait; changed into gown; charted the Patient for an Emergency Room Physician to see.

## 2024-06-19 NOTE — ED NOTES
Discharge education provided by ERP. Discharge paperwork reviewed with patient. PIV removed without incident. Prescription to be picked up by patient. All questions answered. All belongings with patient. Patient ambulated to lobby unassisted with steady gait.

## 2024-06-19 NOTE — DISCHARGE INSTRUCTIONS
You were seen in the emergency department for low back pain.    We felt that imaging was unnecessary and that your symptoms were consistent with a strain and spasming in your low back.    Please utilize a heat pack and the prescribed lidocaine patch to help with your pain over the next several days.    Avoid being sedentary, engage in gentle stretching and walking which will help reduce the longevity of your pain and spasming.    Take Tylenol 1000 mg, ibuprofen 400 mg unless you have a contraindication to one of these medications to help with your symptoms.     If you develop worsening pain, inability to ambulate, loss ability to control your bowel or bladder function, numbness or weakness in your legs, come back for further evaluation.

## 2024-06-19 NOTE — ED PROVIDER NOTES
"ED Provider Note    CHIEF COMPLAINT  Chief Complaint   Patient presents with    Back Pain     Chronic back pain, getting worse, and for the last 2 weeks having bowel incontinence. No recent trauma       EXTERNAL RECORDS REVIEWED  Urgent care note from earlier today reviewed for medical history    HPI/ROS  LIMITATION TO HISTORY   Select: : None  OUTSIDE HISTORIAN(S):  none    Nesha Mart is a 49 y.o. female with past medical history of chronic low back pain, chronic neck pain presenting to the emergency department from urgent care for evaluation of low back pain.  Patient says that she has had worsening of her chronic low back pain, has been dealing with pain from her low back for the last 10 years.  Says that she has herniated disks in her low cervical spine as well as L5-S1 vertebral levels, diagnosed on MRI obtained 7 years ago.  She says that over the last several days she has had worsening of her typical pain.  She says that she has also had several episodes of incontinence of feces over the last 2 weeks, says that she just cannot really feel when she goes to the bathroom.  Denies any perineal numbness but does endorse some numbness \"in her vaginal cavity\".  Says that she has had intermittent incontinence of urine ongoing for last several years and this has not changed over the last several weeks.  No numbness weakness tingling in her legs, no foot drop.  She does say that she has sciatica symptoms down her right lower extremity which are chronic for her.      Patient also says that she has a mass on the left side of her neck, holds her neck at a tilt because it helps relieve the pain, the pain is worse when she presses on the mass.        PAST MEDICAL HISTORY   has a past medical history of Cervical herniated disc.    SURGICAL HISTORY   has a past surgical history that includes tubal ligation.    FAMILY HISTORY  Family History   Problem Relation Age of Onset    Thyroid Mother         hypothyroid    Thyroid " "Sister         hypothyroid       SOCIAL HISTORY  Social History     Tobacco Use    Smoking status: Former     Types: Cigarettes    Smokeless tobacco: Never   Vaping Use    Vaping status: Every Day    Substances: Nicotine, Flavoring    Devices: Refillable tank   Substance and Sexual Activity    Alcohol use: Not Currently     Comment: occasional    Drug use: Never    Sexual activity: Not on file       CURRENT MEDICATIONS  Home Medications       Reviewed by Tresa Taylor R.N. (Registered Nurse) on 06/18/24 at 1652  Med List Status: Partial     Medication Last Dose Status   Black Cohosh 540 MG Cap  Active   cyclobenzaprine (FLEXERIL) 10 mg Tab  Active   escitalopram (LEXAPRO) 20 MG tablet  Active   gabapentin (NEURONTIN) 300 MG Cap  Active   hydrOXYzine HCl (ATARAX) 25 MG Tab  Active   MAGNESIUM PO  Active   meloxicam (MOBIC) 15 MG tablet  Active   ondansetron (ZOFRAN ODT) 4 MG TABLET DISPERSIBLE  Active   propranolol CR (INDERAL LA) 80 MG CAPSULE SR 24 HR  Active   QUEtiapine (SEROQUEL) 25 MG Tab  Active   Saccharomyces boulardii (PROBIOTIC) 250 MG Cap  Active                    ALLERGIES  Allergies   Allergen Reactions    Lyrica Palpitations     Pt states she \"felt weird\" and noticed elevated HR       PHYSICAL EXAM  VITAL SIGNS: /78   Pulse 67   Temp 36.6 °C (97.8 °F) (Temporal)   Resp 14   Wt 76.5 kg (168 lb 10.4 oz)   SpO2 97%   BMI 29.88 kg/m²    General: Well- appearing , non-toxic, no acute distress  Neuro: oriented x 3, moving all extremities.   HEENT:   - Head: Normocephalic, atraumatic  - Eyes: PERRL  - Ears/Nose: normal external nose and ears  - Mouth: moist mucosal membranes  Resp: clear to auscultation, no increased work of breathing  CV: Regular rate and rhythm  Abd: Soft, non-tender, non-distended  Extremities: 5/5 strength on dorsiflexion plantarflexion of the great toe, foot about the ankle, sensation intact to light touch bilaterally on all aspects of the foot.  Psych: lucid and " conversational         DIAGNOSTIC STUDIES / PROCEDURES    EKG  My independent EKG interpretation:  Results for orders placed or performed in visit on 24   EKG   Result Value Ref Range    Report       [facility]    Test Date:  2024  Pt Name:    YASH BARNES             Department: [facility]  MRN:        [id number]                      Room:  Gender:     Female                       Technician: GRABIEL  :        1975                   Requested By:LEXA BASHIR  Order #:    [id number]                    Reading MD: Salvador Roldan MD    Measurements  Intervals                                Axis  Rate:       78                           P:          57  CO:         143                          QRS:        74  QRSD:       82                           T:          -85  QT:         387  QTc:        443    Interpretive Statements  Sinus rhythm  Nonspecific T abnormalities, anterior leads  Compared to ECG 03/10/2023 08:59:34  T-wave abnormality now present  Sinus tachycardia no longer present  Electronically Signed On 2024 16:26:20 PDT by Salvador Roldan MD         LABS  Results for orders placed or performed during the hospital encounter of 24   CBC WITH DIFFERENTIAL   Result Value Ref Range    WBC 7.1 4.8 - 10.8 K/uL    RBC 4.44 4.20 - 5.40 M/uL    Hemoglobin 13.6 12.0 - 16.0 g/dL    Hematocrit 40.1 37.0 - 47.0 %    MCV 90.3 81.4 - 97.8 fL    MCH 30.6 27.0 - 33.0 pg    MCHC 33.9 32.2 - 35.5 g/dL    RDW 43.4 35.9 - 50.0 fL    Platelet Count 413 164 - 446 K/uL    MPV 9.1 9.0 - 12.9 fL    Neutrophils-Polys 60.70 44.00 - 72.00 %    Lymphocytes 31.00 22.00 - 41.00 %    Monocytes 5.00 0.00 - 13.40 %    Eosinophils 2.10 0.00 - 6.90 %    Basophils 0.80 0.00 - 1.80 %    Immature Granulocytes 0.40 0.00 - 0.90 %    Nucleated RBC 0.00 0.00 - 0.20 /100 WBC    Neutrophils (Absolute) 4.33 1.82 - 7.42 K/uL    Lymphs (Absolute) 2.21 1.00 - 4.80 K/uL    Monos (Absolute) 0.36 0.00 - 0.85  K/uL    Eos (Absolute) 0.15 0.00 - 0.51 K/uL    Baso (Absolute) 0.06 0.00 - 0.12 K/uL    Immature Granulocytes (abs) 0.03 0.00 - 0.11 K/uL    NRBC (Absolute) 0.00 K/uL   COMP METABOLIC PANEL   Result Value Ref Range    Sodium 135 135 - 145 mmol/L    Potassium 3.8 3.6 - 5.5 mmol/L    Chloride 100 96 - 112 mmol/L    Co2 23 20 - 33 mmol/L    Anion Gap 12.0 7.0 - 16.0    Glucose 95 65 - 99 mg/dL    Bun 16 8 - 22 mg/dL    Creatinine 0.67 0.50 - 1.40 mg/dL    Calcium 9.6 8.5 - 10.5 mg/dL    Correct Calcium 9.1 8.5 - 10.5 mg/dL    AST(SGOT) 25 12 - 45 U/L    ALT(SGPT) 17 2 - 50 U/L    Alkaline Phosphatase 112 (H) 30 - 99 U/L    Total Bilirubin 0.2 0.1 - 1.5 mg/dL    Albumin 4.6 3.2 - 4.9 g/dL    Total Protein 8.5 (H) 6.0 - 8.2 g/dL    Globulin 3.9 (H) 1.9 - 3.5 g/dL    A-G Ratio 1.2 g/dL   HCG QUANTITATIVE SERUM   Result Value Ref Range    Bhcg 1.0 0.0 - 5.0 mIU/mL   ESTIMATED GFR   Result Value Ref Range    GFR (CKD-EPI) 107 >60 mL/min/1.73 m 2       RADIOLOGY  I have independently interpreted the diagnostic imaging associated with this visit and am waiting the final reading from the radiologist.   My preliminary interpretation is as follows:   -   Radiologist interpretation:   MR-LUMBAR SPINE-W/O   Final Result      Mild degenerative disease in the lumbar spine as described above.              MEDICAL DECISION MAKING      ED COURSE AND PLAN    Nesha Mart is a 49 y.o. female presenting to the emergency department for evaluation of an exacerbation of her low back pain and reported incontinence of feces present for the last 2 weeks.  On evaluation, patient has a relatively reassuring physical exam, vital signs are stable.  Incontinence of stool is concerning, patient says this is new over the last 2 weeks.  Last MRI was obtained 7 years ago.  Patient denies any IV drug use, no infectious symptoms, no steroid use, felt low risk for epidural abscess, discitis, osteomyelitis and spine.  No recent trauma to suggest  vertebral compression fracture.  Will obtain noncontrast MRI of the lumbar spine    ---Pertinent ED Course---:    8:24 PM I reviewed the patient's old records in Epic, medication list, allergies, past medical history and performed a physical examination.     11:00 PM MRI reviewed shows degenerative changes but no evidence of central cord stenosis, cauda equina syndrome.  Patient was reevaluated, her pain is improved after treatment with gabapentin Valium Tylenol and lidocaine patch in the emergency department.  I advised her to follow-up with her primary care physician for ongoing symptoms or to come back to the emergency department for worsening exam.  Unclear what is the cause of her fecal incontinence but do not think that is related to her spine at this time.  Patient was prescribed lidocaine patches, is discharged in stable condition, patient is comfortable with the above plan    Procedures:      ----------------------------------------------------------------------------------  DISCUSSIONS    I have discussed management of the patient with the following physicians and CRICKET's:      Discussion of management with other [facility] or appropriate source(s):     Escalation of care considered, and ultimately not performed: Considered but no indication for spine surgery consultation    Barriers to care at this time, including but not limited to:     Decision tools and prescription drugs considered including, but not limited to: Prescribed lidocaine patch    FINAL IMPRESSION    1. Acute exacerbation of chronic low back pain        Discharge Medication List as of 6/18/2024 11:55 PM        START taking these medications    Details   lidocaine (LIDODERM) 5 % Patch Place 1 Patch on the skin every 24 hours., Disp-10 Patch, R-0, Normal               DISPOSITION    Discharge home, Stable        This chart was dictated using an electronic voice recognition software. The chart has been reviewed and edited but there is still possibility  for dictation errors due to limitation of software.    Duy Navarro, DO 6/18/2024

## 2024-06-19 NOTE — ED NOTES
MRI contacted in order to provide a clearer ETA for patient, MRI staff states patient will be the next one grabbed.

## 2024-08-19 ENCOUNTER — APPOINTMENT (OUTPATIENT)
Dept: RADIOLOGY | Facility: IMAGING CENTER | Age: 49
End: 2024-08-19
Attending: FAMILY MEDICINE
Payer: COMMERCIAL

## 2024-08-19 ENCOUNTER — OFFICE VISIT (OUTPATIENT)
Dept: URGENT CARE | Facility: PHYSICIAN GROUP | Age: 49
End: 2024-08-19
Payer: COMMERCIAL

## 2024-08-19 VITALS
BODY MASS INDEX: 30.51 KG/M2 | DIASTOLIC BLOOD PRESSURE: 80 MMHG | RESPIRATION RATE: 14 BRPM | SYSTOLIC BLOOD PRESSURE: 106 MMHG | WEIGHT: 172.2 LBS | HEIGHT: 63 IN | TEMPERATURE: 97.6 F | OXYGEN SATURATION: 98 % | HEART RATE: 77 BPM

## 2024-08-19 DIAGNOSIS — S80.01XA CONTUSION OF RIGHT KNEE, INITIAL ENCOUNTER: ICD-10-CM

## 2024-08-19 DIAGNOSIS — S90.32XA CONTUSION OF LEFT FOOT, INITIAL ENCOUNTER: ICD-10-CM

## 2024-08-19 PROCEDURE — 3079F DIAST BP 80-89 MM HG: CPT | Performed by: FAMILY MEDICINE

## 2024-08-19 PROCEDURE — 3074F SYST BP LT 130 MM HG: CPT | Performed by: FAMILY MEDICINE

## 2024-08-19 PROCEDURE — 99214 OFFICE O/P EST MOD 30 MIN: CPT | Performed by: FAMILY MEDICINE

## 2024-08-19 PROCEDURE — 73562 X-RAY EXAM OF KNEE 3: CPT | Mod: TC,FY,RT | Performed by: RADIOLOGY

## 2024-08-19 PROCEDURE — 73630 X-RAY EXAM OF FOOT: CPT | Mod: TC,FY,LT | Performed by: RADIOLOGY

## 2024-08-19 RX ORDER — HYDROCODONE BITARTRATE AND ACETAMINOPHEN 5; 325 MG/1; MG/1
1 TABLET ORAL EVERY 8 HOURS PRN
Qty: 5 TABLET | Refills: 0 | Status: SHIPPED | OUTPATIENT
Start: 2024-08-19 | End: 2024-08-26

## 2024-08-19 ASSESSMENT — FIBROSIS 4 INDEX: FIB4 SCORE: 0.72

## 2024-08-19 NOTE — LETTER
August 19, 2024         Patient: Nesha Mart   YOB: 1975   Date of Visit: 8/19/2024           To Whom it May Concern:    Nesha Mart was seen in my clinic on 8/19/2024.         Please excuse recent absence 17 August and 21 August.     If you have any questions or concerns, please don't hesitate to call.        Sincerely,           Blayne Smalls M.D.  Electronically Signed

## 2024-08-19 NOTE — PROGRESS NOTES
"Chief Complaint   Patient presents with    Knee Injury    Toe Injury     Pt states she tripped over a baby gate and injured her right knee and left big toe on her foot. Abrasion on right knee, contusion on left big toe.        S/p GLF 2 d ago - tripped over baby gate at home.       C/o pain over rt knee, left foot.        Pain is worse with wtbearing.    Motrin helps, \"a little.\"              Pertinent negatives include no  loss of motion, muscle weakness, numbness or tingling. The symptoms are aggravated by weight bearing and palpation.          Social History     Tobacco Use    Smoking status: Former     Types: Cigarettes    Smokeless tobacco: Never   Vaping Use    Vaping status: Every Day    Substances: Nicotine, Flavoring    Devices: Bocandy tank   Substance Use Topics    Alcohol use: Not Currently     Comment: occasional    Drug use: Never         Past Medical History:   Diagnosis Date    Cervical herniated disc          Family hx was reviewed - no pertinent past family hx        Review of Systems   Constitutional: Negative for fever, chills and weight loss.   HENT - denies cough, ear pain, congestion, sore throat  Eyes: denies vision changes, discharge  Respiratory: Negative for cough and wheezing.    Cardiovascular: Negative for chest pain or PND.   Gastrointestinal:  No abdominal pain,  nausea, vomiting, diarrhea.  Negative for  blood in stool.    - no discharge, dysuria, frequency.      Neurological: Negative for dizziness and headaches.   musculoskeletal - denies myalgias, calf pain  Psych - denies anxiety/depression/mood changes.  Skin: no itching or rash  All other systems reviewed and are negative.           Objective:     /80 (BP Location: Right arm, Patient Position: Sitting, BP Cuff Size: Adult)   Pulse 77   Temp 36.4 °C (97.6 °F) (Temporal)   Resp 14   Ht 1.6 m (5' 3\")   Wt 78.1 kg (172 lb 3.2 oz)   SpO2 98%         Physical Exam   Constitutional: pt is oriented to person, place, " and time. Pt appears well-developed. No distress.   HENT:   Head: Normocephalic and atraumatic.   Eyes: Conjunctivae are normal.   Cardiovascular: Normal rate and regular rhythm.    Pulmonary/Chest: Effort normal and breath sounds normal.   Musculoskeletal:   Rt knee:  + TTP over medial knee.   full AROM.   + superficial abrasion , bruising over patella.   No redness or D/C         Left ankle: pt exhibits no edema or ecchymosis.    No medial or lateral malleolus tenderness noted.        Achilles tendon normal.        Left foot: + tender to palpation over distal and lateral foot.    + bruising. There is normal range of motion, normal capillary refill and no crepitus.   Neurological: pt is alert and oriented to person, place, and time. No cranial nerve deficit.   Skin: Skin is warm. Pt is not diaphoretic. No erythema.   Psychiatric: His behavior is normal.   Nursing note and vitals reviewed.      Assessment & Plan      Result Notes  Details    Reading Physician Reading Date Result Priority   Meng Singh M.D.  [phone] 8/19/2024      Narrative & Impression     8/19/2024 12:38 PM     HISTORY/REASON FOR EXAM:  Pain/Deformity Following Trauma  Left foot pain, fall, injury     TECHNIQUE/EXAM DESCRIPTION AND NUMBER OF VIEWS:  3 views of the LEFT foot.     COMPARISON:  None.     FINDINGS:  There is no fracture.     Alignment is normal.     No degenerative changes are present.     IMPRESSION:     Negative left foot series           Exam Ended: 08/19/24 12:52 PM Last Resulted: 08/19/24  1:06 PM            Reading Physician Reading Date Result Priority   Meng Singh M.D.  [phone] 8/19/2024      Narrative & Impression     8/19/2024 12:38 PM     HISTORY/REASON FOR EXAM:  Pain/Deformity Following Trauma.  Right knee pain, injury     TECHNIQUE/EXAM DESCRIPTION AND NUMBER OF VIEWS:  4 views of the RIGHT knee.     COMPARISON: None     FINDINGS:  There is no fracture.     Alignment is normal.     No degenerative  changes are present.     IMPRESSION:     Negative right knee series           Exam Ended: 08/19/24 12:51 PM Last Resulted: 08/19/24  1:05 PM            A/P:    1. Contusion of right knee, initial encounter      X-rays were personally reviewed by myself.   There is no fracture or arthritic changes   noted.      Hinged knee brace provided.        - HYDROcodone-acetaminophen (NORCO) 5-325 MG Tab per tablet; Take 1 Tablet by mouth every 8 hours as needed (pain) for up to 7 days.  Dispense: 5 Tablet; Refill: 0      Narcotic use report obtained with no indication of narcotic overuse or misuse and deemed necessary for treaatment. Sedation, dependence, and constipation precautions given. Avoid use while driving or operating heavy machinery.           2. Contusion of left foot, initial encounter    X-rays were personally reviewed by myself.   There is no fracture or arthritic changes   noted.         - diclofenac sodium (VOLTAREN) 1 % Gel; Apply 4 g topically 4 times a day as needed (pain).  Dispense: 50 g; Refill: 0  - HYDROcodone-acetaminophen (NORCO) 5-325 MG Tab per tablet; Take 1 Tablet by mouth every 8 hours as needed (pain) for up to 7 days.  Dispense: 5 Tablet; Refill: 0          Differential diagnosis, natural history, supportive care, and indications for immediate follow-up discussed. All questions answered. Patient agrees with the plan of care.     Follow-up as needed if symptoms worsen or fail to improve to PCP, Urgent care or Emergency Room.     I have personally reviewed prior external notes and test results pertinent to today's visit.  I have independently reviewed and interpreted all diagnostics ordered during this urgent care acute visit.

## 2024-09-27 ENCOUNTER — HOSPITAL ENCOUNTER (OUTPATIENT)
Facility: MEDICAL CENTER | Age: 49
End: 2024-09-27
Attending: NURSE PRACTITIONER
Payer: COMMERCIAL

## 2024-09-27 ENCOUNTER — OFFICE VISIT (OUTPATIENT)
Dept: URGENT CARE | Facility: PHYSICIAN GROUP | Age: 49
End: 2024-09-27
Payer: COMMERCIAL

## 2024-09-27 VITALS
HEIGHT: 63 IN | SYSTOLIC BLOOD PRESSURE: 100 MMHG | BODY MASS INDEX: 31.71 KG/M2 | HEART RATE: 66 BPM | RESPIRATION RATE: 16 BRPM | TEMPERATURE: 97 F | WEIGHT: 179 LBS | DIASTOLIC BLOOD PRESSURE: 68 MMHG | OXYGEN SATURATION: 98 %

## 2024-09-27 DIAGNOSIS — J02.9 SORE THROAT: ICD-10-CM

## 2024-09-27 DIAGNOSIS — Z20.818 EXPOSURE TO STREP THROAT: ICD-10-CM

## 2024-09-27 LAB — S PYO DNA SPEC NAA+PROBE: NOT DETECTED

## 2024-09-27 PROCEDURE — 3074F SYST BP LT 130 MM HG: CPT | Performed by: NURSE PRACTITIONER

## 2024-09-27 PROCEDURE — 87070 CULTURE OTHR SPECIMN AEROBIC: CPT

## 2024-09-27 PROCEDURE — 87651 STREP A DNA AMP PROBE: CPT | Performed by: NURSE PRACTITIONER

## 2024-09-27 PROCEDURE — 99213 OFFICE O/P EST LOW 20 MIN: CPT | Performed by: NURSE PRACTITIONER

## 2024-09-27 PROCEDURE — 3078F DIAST BP <80 MM HG: CPT | Performed by: NURSE PRACTITIONER

## 2024-09-27 RX ORDER — METHIMAZOLE 10 MG/1
3 TABLET ORAL DAILY
COMMUNITY

## 2024-09-27 RX ORDER — SULFAMETHOXAZOLE/TRIMETHOPRIM 800-160 MG
160 TABLET ORAL
COMMUNITY
Start: 2024-08-30 | End: 2024-09-27

## 2024-09-27 RX ORDER — FUROSEMIDE 40 MG
1 TABLET ORAL
COMMUNITY

## 2024-09-27 RX ORDER — ATENOLOL 50 MG/1
TABLET ORAL
COMMUNITY

## 2024-09-27 ASSESSMENT — FIBROSIS 4 INDEX: FIB4 SCORE: 0.72

## 2024-09-27 NOTE — LETTER
September 27, 2024       Patient: Nesha Mart   YOB: 1975   Date of Visit: 9/27/2024         To Whom It May Concern:    In my medical opinion, I recommend that Nesha Mart be excused from work 9/27/2024 and 9/28/2024 due to illness.     If you have any questions or concerns, please don't hesitate to call [phone]          Sincerely,          Susie Astudillo A.P.R.N.  Electronically Signed

## 2024-09-27 NOTE — PROGRESS NOTES
"Verbal consent was acquired by the patient to use [employer] ambient listening note generation during this visit.    Subjective:     HPI:   History of Present Illness  The patient is a 48-year-old female who presents for evaluation of sore throat.    She began experiencing a sore throat yesterday, accompanied by lightheadedness, nausea, and vomiting. She also reports feeling chilly. Her son was recently diagnosed with strep throat, leading her to believe she may have contracted the same infection. She has not yet tried any home remedies such as gargling.      Objective:     Exam:  /68   Pulse 66   Temp 36.1 °C (97 °F) (Temporal)   Resp 16   Ht 1.6 m (5' 3\")   Wt 81.2 kg (179 lb)   SpO2 98%   BMI 31.71 kg/m²  Body mass index is 31.71 kg/m².    Physical Exam  Vitals and nursing note reviewed.   Constitutional:       General: She is not in acute distress.     Appearance: Normal appearance. She is not ill-appearing.   HENT:      Head: Normocephalic and atraumatic.      Nose: Nose normal.      Mouth/Throat:      Mouth: Mucous membranes are moist.      Pharynx: Uvula midline. Pharyngeal swelling and posterior oropharyngeal erythema present. No oropharyngeal exudate, uvula swelling or postnasal drip.      Tonsils: No tonsillar exudate or tonsillar abscesses. 1+ on the right. 1+ on the left.   Eyes:      Extraocular Movements: Extraocular movements intact.      Conjunctiva/sclera: Conjunctivae normal.      Pupils: Pupils are equal, round, and reactive to light.   Cardiovascular:      Rate and Rhythm: Normal rate and regular rhythm.      Pulses: Normal pulses.      Heart sounds: Normal heart sounds.   Pulmonary:      Effort: Pulmonary effort is normal.      Breath sounds: Normal breath sounds.   Musculoskeletal:         General: Normal range of motion.      Cervical back: Normal range of motion and neck supple.   Skin:     General: Skin is warm and dry.      Capillary Refill: Capillary refill takes less than 2 " seconds.   Neurological:      General: No focal deficit present.      Mental Status: She is alert and oriented to person, place, and time.       Results for orders placed or performed in visit on 09/27/24   POCT GROUP A STREP, PCR   Result Value Ref Range    POC Group A Strep, PCR Not Detected Not Detected, Invalid         Assessment & Plan:     1. Sore throat  POCT GROUP A STREP, PCR    CULTURE THROAT      2. Exposure to strep throat  POCT GROUP A STREP, PCR    CULTURE THROAT          Assessment & Plan  1. Sore throat, acute.  Point of Care testing for GABHS is NEGATIVE  Throat culture obtained.  I confirmed the patient's telephone number for follow up and informed them that I WILL ONLY CALL THEM if the culture is abnormal and intervention is necessary. Pt is amenable with me leaving a voicemail if they are unavailable.  Discussed supportive care including salt water gargles, benzocaine drops  Discussed return precautions including signs and symptoms of peritonsillar abscess, retropharyngeal abscess  Counseled the patient to follow up with primary care provider (or establish a primary care provider) for ongoing health maintenance                 Susie Astudillo, ARABELLA.P.R.N.      Please note that this dictation was created using voice recognition software. I have made every reasonable attempt to correct obvious errors, but I expect that there are errors of grammar and possibly content that I did not discover before finalizing the note.

## 2024-09-29 LAB
BACTERIA SPEC RESP CULT: NORMAL
SIGNIFICANT IND 70042: NORMAL
SITE SITE: NORMAL
SOURCE SOURCE: NORMAL

## 2025-01-09 ENCOUNTER — APPOINTMENT (OUTPATIENT)
Dept: RADIOLOGY | Facility: MEDICAL CENTER | Age: 50
End: 2025-01-09
Attending: EMERGENCY MEDICINE
Payer: COMMERCIAL

## 2025-01-09 ENCOUNTER — HOSPITAL ENCOUNTER (EMERGENCY)
Facility: MEDICAL CENTER | Age: 50
End: 2025-01-09
Attending: EMERGENCY MEDICINE
Payer: COMMERCIAL

## 2025-01-09 VITALS
DIASTOLIC BLOOD PRESSURE: 59 MMHG | HEIGHT: 63 IN | TEMPERATURE: 96.8 F | SYSTOLIC BLOOD PRESSURE: 109 MMHG | WEIGHT: 177.47 LBS | RESPIRATION RATE: 12 BRPM | OXYGEN SATURATION: 91 % | BODY MASS INDEX: 31.45 KG/M2 | HEART RATE: 63 BPM

## 2025-01-09 DIAGNOSIS — R41.0 TRANSIENT CONFUSION: Primary | ICD-10-CM

## 2025-01-09 LAB
ALBUMIN SERPL BCP-MCNC: 4.4 G/DL (ref 3.2–4.9)
ALBUMIN/GLOB SERPL: 1.2 G/DL
ALP SERPL-CCNC: 99 U/L (ref 30–99)
ALT SERPL-CCNC: 35 U/L (ref 2–50)
AMPHET UR QL SCN: NEGATIVE
ANION GAP SERPL CALC-SCNC: 10 MMOL/L (ref 7–16)
AST SERPL-CCNC: 37 U/L (ref 12–45)
BARBITURATES UR QL SCN: NEGATIVE
BASOPHILS # BLD AUTO: 0.8 % (ref 0–1.8)
BASOPHILS # BLD: 0.06 K/UL (ref 0–0.12)
BENZODIAZ UR QL SCN: NEGATIVE
BILIRUB SERPL-MCNC: 0.2 MG/DL (ref 0.1–1.5)
BUN SERPL-MCNC: 12 MG/DL (ref 8–22)
BZE UR QL SCN: NEGATIVE
CALCIUM ALBUM COR SERPL-MCNC: 9.6 MG/DL (ref 8.5–10.5)
CALCIUM SERPL-MCNC: 9.9 MG/DL (ref 8.5–10.5)
CANNABINOIDS UR QL SCN: NEGATIVE
CHLORIDE SERPL-SCNC: 102 MMOL/L (ref 96–112)
CO2 SERPL-SCNC: 25 MMOL/L (ref 20–33)
CREAT SERPL-MCNC: 0.97 MG/DL (ref 0.5–1.4)
EKG IMPRESSION: NORMAL
EOSINOPHIL # BLD AUTO: 0.16 K/UL (ref 0–0.51)
EOSINOPHIL NFR BLD: 2.1 % (ref 0–6.9)
ERYTHROCYTE [DISTWIDTH] IN BLOOD BY AUTOMATED COUNT: 42.4 FL (ref 35.9–50)
FENTANYL UR QL: NEGATIVE
GFR SERPLBLD CREATININE-BSD FMLA CKD-EPI: 71 ML/MIN/1.73 M 2
GLOBULIN SER CALC-MCNC: 3.7 G/DL (ref 1.9–3.5)
GLUCOSE SERPL-MCNC: 103 MG/DL (ref 65–99)
HCT VFR BLD AUTO: 41 % (ref 37–47)
HGB BLD-MCNC: 13.6 G/DL (ref 12–16)
IMM GRANULOCYTES # BLD AUTO: 0.02 K/UL (ref 0–0.11)
IMM GRANULOCYTES NFR BLD AUTO: 0.3 % (ref 0–0.9)
LYMPHOCYTES # BLD AUTO: 2.01 K/UL (ref 1–4.8)
LYMPHOCYTES NFR BLD: 26.7 % (ref 22–41)
MCH RBC QN AUTO: 31.1 PG (ref 27–33)
MCHC RBC AUTO-ENTMCNC: 33.2 G/DL (ref 32.2–35.5)
MCV RBC AUTO: 93.8 FL (ref 81.4–97.8)
METHADONE UR QL SCN: NEGATIVE
MONOCYTES # BLD AUTO: 0.49 K/UL (ref 0–0.85)
MONOCYTES NFR BLD AUTO: 6.5 % (ref 0–13.4)
NEUTROPHILS # BLD AUTO: 4.78 K/UL (ref 1.82–7.42)
NEUTROPHILS NFR BLD: 63.6 % (ref 44–72)
NRBC # BLD AUTO: 0 K/UL
NRBC BLD-RTO: 0 /100 WBC (ref 0–0.2)
OPIATES UR QL SCN: NEGATIVE
OXYCODONE UR QL SCN: NEGATIVE
PCP UR QL SCN: NEGATIVE
PLATELET # BLD AUTO: 324 K/UL (ref 164–446)
PMV BLD AUTO: 9.3 FL (ref 9–12.9)
POTASSIUM SERPL-SCNC: 4.4 MMOL/L (ref 3.6–5.5)
PROPOXYPH UR QL SCN: NEGATIVE
PROT SERPL-MCNC: 8.1 G/DL (ref 6–8.2)
RBC # BLD AUTO: 4.37 M/UL (ref 4.2–5.4)
SODIUM SERPL-SCNC: 137 MMOL/L (ref 135–145)
WBC # BLD AUTO: 7.5 K/UL (ref 4.8–10.8)

## 2025-01-09 PROCEDURE — 36415 COLL VENOUS BLD VENIPUNCTURE: CPT

## 2025-01-09 PROCEDURE — 99283 EMERGENCY DEPT VISIT LOW MDM: CPT

## 2025-01-09 PROCEDURE — 80307 DRUG TEST PRSMV CHEM ANLYZR: CPT

## 2025-01-09 PROCEDURE — 80053 COMPREHEN METABOLIC PANEL: CPT

## 2025-01-09 PROCEDURE — 85025 COMPLETE CBC W/AUTO DIFF WBC: CPT

## 2025-01-09 PROCEDURE — 93005 ELECTROCARDIOGRAM TRACING: CPT | Mod: TC | Performed by: EMERGENCY MEDICINE

## 2025-01-09 PROCEDURE — 70450 CT HEAD/BRAIN W/O DYE: CPT

## 2025-01-09 ASSESSMENT — FIBROSIS 4 INDEX: FIB4 SCORE: 0.72

## 2025-01-09 ASSESSMENT — PAIN DESCRIPTION - PAIN TYPE: TYPE: CHRONIC PAIN

## 2025-01-09 ASSESSMENT — LIFESTYLE VARIABLES: DO YOU DRINK ALCOHOL: NO

## 2025-01-09 NOTE — ED TRIAGE NOTES
"Chief Complaint   Patient presents with    Other     Pt reports confusion tonight. Pt states she would catch herself almost falling, and \"losing 5-10minutes of time\". Pt would \"come to\" and would be reaching for things in kitchen that weren't there.   + hallucinations. Reports seeing light as a pineapple.   Pt concerned for symptoms, pt states this has not happened before. No history of hallucinating /  mental health issues.      Pt ambulatory with steady gait  triage room.   Pt is GCS 15, speaking in full sentences, follows commands and responds appropriately to questions. Resp are even and unlabored.     Pt educated on triage process, updated/ agreeable to plan of care.     /78   Pulse 81   Temp 36.6 °C (97.8 °F) (Temporal)   Resp 20   Ht 1.6 m (5' 3\")   Wt 80.5 kg (177 lb 7.5 oz)   SpO2 95%   BMI 31.44 kg/m²     "

## 2025-01-09 NOTE — ED PROVIDER NOTES
"ER Provider Note    Scribed for Andry Alonzo II, M.D. by Branden Pierce. 1/9/2025  2:46 AM    Primary Care Provider: SAMARA Louis    CHIEF COMPLAINT   Chief Complaint   Patient presents with    Other     Pt reports confusion tonight. Pt states she would catch herself almost falling, and \"losing 5-10minutes of time\". Pt would \"come to\" and would be reaching for things in kitchen that weren't there.   + hallucinations. Reports seeing light as a pineapple.   Pt concerned for symptoms, pt states this has not happened before. No history of hallucinating /  mental health issues.          HPI/ROS  LIMITATION TO HISTORY   Select: : None  OUTSIDE HISTORIAN(S):  None known    Nesha Mart is a 49 y.o. female who presents to the ED for evaluation of episodes where she would catch herself falling, hallucinate. The patient reports getting to work at 6 PM tonight and within a couple hours she reports feeling herself falling, \"losing 5-10 minutes of time,\" reaching for objects that weren't there.The patient states her coworkers notice her behavior is odd during these episodes. She reports headache earlier, but not currently. Symptoms reports losing periods of time at work for the last few weeks, but falling and hallucinations are new. The patient denies any such symptoms at home. She denies having fallen to the ground. She denies any seizures. The patient denies any trouble sleeping, reports getting around  6 hours of sleep usually. She is on gabapentin, Seroquel, hydroxyzine, flexeril, none of which are new.     PAST MEDICAL HISTORY  Past Medical History:   Diagnosis Date    Cervical herniated disc      SURGICAL HISTORY  Past Surgical History:   Procedure Laterality Date    TUBAL LIGATION       FAMILY HISTORY  Family History   Problem Relation Age of Onset    Thyroid Mother         hypothyroid    Thyroid Sister         hypothyroid     SOCIAL HISTORY   reports that she has quit smoking. Her smoking use included " "cigarettes. She has never used smokeless tobacco. She reports that she does not currently use alcohol. She reports that she does not use drugs.    CURRENT MEDICATIONS  Previous Medications    ATENOLOL (TENORMIN) 50 MG TAB        BLACK COHOSH 540 MG CAP    Take 67,000 mg by mouth.    CYCLOBENZAPRINE (FLEXERIL) 10 MG TAB    Take 10 mg by mouth in the morning, at noon, and at bedtime.    DICLOFENAC SODIUM (VOLTAREN) 1 % GEL    Apply 4 g topically 4 times a day as needed (pain).    ESCITALOPRAM (LEXAPRO) 20 MG TABLET    Take 20 mg by mouth every day.    FUROSEMIDE (LASIX) 40 MG TAB    Take 1 Tablet by mouth every day.    GABAPENTIN (NEURONTIN) 300 MG CAP    Take 300 mg by mouth at bedtime.    HYDROXYZINE HCL (ATARAX) 25 MG TAB    TAKE 1-3 TABLETS BY MOUTH ONCE DAILY AT BEDTIME AS NEEDED FOR INSOMNIA AND ITCHING.    LIDOCAINE (LIDODERM) 5 % PATCH    Place 1 Patch on the skin every 24 hours.    MAGNESIUM PO    Take 1 Tablet by mouth every day.    MELOXICAM (MOBIC) 15 MG TABLET    Take 15 mg by mouth every day.    METHIMAZOLE (TAPAZOLE) 10 MG TAB    Take 3 Tablets by mouth every day.    ONDANSETRON (ZOFRAN ODT) 4 MG TABLET DISPERSIBLE    Take 1 Tablet by mouth every 8 hours as needed for Nausea/Vomiting.    PROPRANOLOL CR (INDERAL LA) 80 MG CAPSULE SR 24 HR    Take 80 mg by mouth every day.    QUETIAPINE (SEROQUEL) 25 MG TAB    Take 25 mg by mouth at bedtime.    SACCHAROMYCES BOULARDII (PROBIOTIC) 250 MG CAP    Take  by mouth.       ALLERGIES  Lyrica and Tylenol [acetaminophen]    PHYSICAL EXAM  /60   Pulse 68   Temp 36.6 °C (97.8 °F) (Temporal)   Resp 18   Ht 1.6 m (5' 3\")   Wt 80.5 kg (177 lb 7.5 oz)   SpO2 97%   BMI 31.44 kg/m²   Physical Exam  Vitals and nursing note reviewed.   Constitutional:       Appearance: Normal appearance.   HENT:      Head: Normocephalic and atraumatic.      Mouth/Throat:      Mouth: Mucous membranes are moist.   Eyes:      Extraocular Movements: Extraocular movements intact.      " Pupils: Pupils are equal, round, and reactive to light.   Cardiovascular:      Rate and Rhythm: Normal rate and regular rhythm.   Pulmonary:      Effort: Pulmonary effort is normal.      Breath sounds: Normal breath sounds.   Neurological:      General: No focal deficit present.      Mental Status: She is alert.      Comments: Alert and oriented to person place time and situation. Clear speech. No facial droop or weakness. Normal eye movement. No vision loss. No ataxia of limbs or trunk.    Psychiatric:         Mood and Affect: Mood normal.         Behavior: Behavior normal.         Thought Content: Thought content normal.     DIAGNOSTIC STUDIES    EKG/LABS  Results for orders placed or performed during the hospital encounter of 01/09/25   CBC WITH DIFFERENTIAL    Collection Time: 01/09/25  2:40 AM   Result Value Ref Range    WBC 7.5 4.8 - 10.8 K/uL    RBC 4.37 4.20 - 5.40 M/uL    Hemoglobin 13.6 12.0 - 16.0 g/dL    Hematocrit 41.0 37.0 - 47.0 %    MCV 93.8 81.4 - 97.8 fL    MCH 31.1 27.0 - 33.0 pg    MCHC 33.2 32.2 - 35.5 g/dL    RDW 42.4 35.9 - 50.0 fL    Platelet Count 324 164 - 446 K/uL    MPV 9.3 9.0 - 12.9 fL    Neutrophils-Polys 63.60 44.00 - 72.00 %    Lymphocytes 26.70 22.00 - 41.00 %    Monocytes 6.50 0.00 - 13.40 %    Eosinophils 2.10 0.00 - 6.90 %    Basophils 0.80 0.00 - 1.80 %    Immature Granulocytes 0.30 0.00 - 0.90 %    Nucleated RBC 0.00 0.00 - 0.20 /100 WBC    Neutrophils (Absolute) 4.78 1.82 - 7.42 K/uL    Lymphs (Absolute) 2.01 1.00 - 4.80 K/uL    Monos (Absolute) 0.49 0.00 - 0.85 K/uL    Eos (Absolute) 0.16 0.00 - 0.51 K/uL    Baso (Absolute) 0.06 0.00 - 0.12 K/uL    Immature Granulocytes (abs) 0.02 0.00 - 0.11 K/uL    NRBC (Absolute) 0.00 K/uL   COMP METABOLIC PANEL    Collection Time: 01/09/25  2:40 AM   Result Value Ref Range    Sodium 137 135 - 145 mmol/L    Potassium 4.4 3.6 - 5.5 mmol/L    Chloride 102 96 - 112 mmol/L    Co2 25 20 - 33 mmol/L    Anion Gap 10.0 7.0 - 16.0    Glucose 103  (H) 65 - 99 mg/dL    Bun 12 8 - 22 mg/dL    Creatinine 0.97 0.50 - 1.40 mg/dL    Calcium 9.9 8.5 - 10.5 mg/dL    Correct Calcium 9.6 8.5 - 10.5 mg/dL    AST(SGOT) 37 12 - 45 U/L    ALT(SGPT) 35 2 - 50 U/L    Alkaline Phosphatase 99 30 - 99 U/L    Total Bilirubin 0.2 0.1 - 1.5 mg/dL    Albumin 4.4 3.2 - 4.9 g/dL    Total Protein 8.1 6.0 - 8.2 g/dL    Globulin 3.7 (H) 1.9 - 3.5 g/dL    A-G Ratio 1.2 g/dL   ESTIMATED GFR    Collection Time: 25  2:40 AM   Result Value Ref Range    GFR (CKD-EPI) 71 >60 mL/min/1.73 m 2   URINE DRUG SCREEN    Collection Time: 25  3:29 AM   Result Value Ref Range    Amphetamines Urine Negative Negative    Barbiturates Negative Negative    Benzodiazepines Negative Negative    Cocaine Metabolite Negative Negative    Fentanyl, Urine Negative Negative    Methadone Negative Negative    Opiates Negative Negative    Oxycodone Negative Negative    Phencyclidine -Pcp Negative Negative    Propoxyphene Negative Negative    Cannabinoid Metab Negative Negative   EKG    Collection Time: 25  5:44 AM   Result Value Ref Range    Report       [facility] Emergency Dept.    Test Date:  2025  Pt Name:    YASH BARNES             Department: ER  MRN:        [id number]                      Room:       [facility]  Gender:     Female                       Technician: [id number]  :        1975                   Requested By:ANDRY LABOY II  Order #:    [id number]                    Reading MD: Andry Laboy II, MD    Measurements  Intervals                                Axis  Rate:       65                           P:          32  DE:         162                          QRS:        22  QRSD:       103                          T:          2  QT:         424  QTc:        441    Interpretive Statements  Sinus rhythm  Rate 65  Normal intervals  No ST elevation or depression. Chronic twave flat in inferior lateral leads  Impression: Sinus rhythm, unchanged from  previous    Compared to ECG 03/11/2024 10:09:51  Possible ischemia now present  T-wave abnormality still present  Electronically Signed On 0 1- 05:44:32 PST by Andry Alonzo II, MD       EKG interpreted by me as noted above.     RADIOLOGY/PROCEDURES       Radiologist interpretation:  CT-HEAD W/O   Final Result         1. Brain normal.      2. Severe acute and chronic paranasal sinusitis.      3. The middle ears and visible mastoid sinuses are clear.           COURSE & MEDICAL DECISION MAKING     ASSESSMENT, COURSE AND PLAN  Care Narrative:     2:46 AM - This is an emergency department evaluation of a 49 y.o. female who presents with episodes where she loses track of time, catches herself falling, and was told she looked unsteady at work. She has been having issues with losing track of time and catching herself for the last few weeks, but all other symptoms are new.  She has not had any falls.  On exam here she has completely normal neurologic exam.  There is no ataxia, she has been able to ambulate with a steady gait.  Vital signs are normal.  She is not describing any syncopal episodes or near syncopal episodes.  Not having any palpitations.  Sprain clear based on history and physical what could be causing her symptoms.  She is on a number of medications and I wonder if there could be a med interaction that is causing the symptoms.  However she says she has been on these for over a year and not had problems.  Also denies any infectious symptoms such as fevers.  Ordered CT head without (looking for any space-occupying masses), UDS, CMP, and CBC with diff to evaluate.     3:54 AM - Reviewed imaging.  Findings of possible paranasal sinusitis.  She has not had any nasal complaints.  No facial pains.  No fevers.  This does not appear to be clinically relevant at this time.  Would consider antibiotics had there been leukocytosis in the CBC, facial pain, or typical sinusitis symptoms.  By CBC, CMP within  acceptable limits.  The patient has been ambulatory.    4:25 AM - Patient was reevaluated at bedside. Discussed lab and radiology results with the patient and informed them negative diagnostic workup.  Have not noticed any clues to an underlying emergent condition.  However based on history of the symptoms I think it is pertinent that she contact her primary care provider so that she continue outpatient workup.  Will give the patient a work note. Instructed the patient to use her watch to monitor her heart and follow-up with her primary care provider. She was given strict return precautions for worsening or new symptoms.     I did later see through care everywhere that she had recently been seen by a primary provider in [place].  Undergoing workup for supra clavicular adenopathy (CT scan ordered of torso), left shoulder pain also refilled for long-term medications.  Looks like a number of tests were ordered including vitamin test, iron test, thyroid testing.  She mentioned having some tunnel vision.  Referral was placed to neurology.  There is also notes about having a prior cardiology workup.    4:49 AM - Informed the patient of my plan to obtain an EKG to establish a baseline for future reference.     5:45 AM - Reviewed EKG. no signs of arrhythmia.  Looks similar to previous.  The patient is stable for discharge.     PROBLEM LIST  # Neurologic symptoms   -Unexplained, unknown prognosis.  Not having any specific symptoms here.  See description above.  Serum studies, imaging without any pertinent findings.    DISPOSITION AND DISCUSSIONS  I have discussed management of the patient with the following physicians and CRICKET's:  None    Discussion of management with other [facility] or appropriate source(s): None     Barriers to care at this time, including but not limited to:  None known .   The patient will return for new or worsening symptoms and is stable at the time of discharge.    The patient is referred to a primary  physician for blood pressure management, diabetic screening, and for all other preventative health concerns.    DISPOSITION:  Patient will be discharged home in stable condition.    FOLLOW UP:  [facility]  [address]  [place] [id number] [phone]    Call today      [facility], Emergency Dept  [address] [address] [phone]    If symptoms worsen, persistent, weakness in extremities, vision loss or other serious concerns    OUTPATIENT MEDICATIONS:  Discharge Medication List as of 1/9/2025  5:47 AM         FINAL DIAGNOSIS  1. Transient confusion         Branden CARRERA (Scribe), am scribing for, and in the presence of, ISAEL Griffin II.    Electronically signed by: Branden Pierce (Maximilian), 1/9/2025    Andry CARRERA II, M* personally performed the services described in this documentation, as scribed by Branden Pierce in my presence, and it is both accurate and complete.     The note accurately reflects work and decisions made by me.  Andry Alonzo II, M.D.  1/9/2025  8:20 AM

## 2025-04-12 ENCOUNTER — OFFICE VISIT (OUTPATIENT)
Dept: URGENT CARE | Facility: PHYSICIAN GROUP | Age: 50
End: 2025-04-12
Payer: COMMERCIAL

## 2025-04-12 VITALS
HEIGHT: 63 IN | OXYGEN SATURATION: 98 % | TEMPERATURE: 98 F | RESPIRATION RATE: 16 BRPM | HEART RATE: 78 BPM | BODY MASS INDEX: 30.94 KG/M2 | DIASTOLIC BLOOD PRESSURE: 64 MMHG | WEIGHT: 174.6 LBS | SYSTOLIC BLOOD PRESSURE: 116 MMHG

## 2025-04-12 DIAGNOSIS — J34.89 RHINORRHEA: ICD-10-CM

## 2025-04-12 DIAGNOSIS — R05.1 ACUTE COUGH: ICD-10-CM

## 2025-04-12 DIAGNOSIS — M79.10 MYALGIA: ICD-10-CM

## 2025-04-12 DIAGNOSIS — J04.0 LARYNGITIS: ICD-10-CM

## 2025-04-12 PROCEDURE — 99214 OFFICE O/P EST MOD 30 MIN: CPT | Performed by: NURSE PRACTITIONER

## 2025-04-12 PROCEDURE — 3078F DIAST BP <80 MM HG: CPT | Performed by: NURSE PRACTITIONER

## 2025-04-12 PROCEDURE — 3074F SYST BP LT 130 MM HG: CPT | Performed by: NURSE PRACTITIONER

## 2025-04-12 RX ORDER — ACETAMINOPHEN AND CODEINE PHOSPHATE 300; 30 MG/1; MG/1
1-2 TABLET ORAL EVERY 4 HOURS PRN
COMMUNITY

## 2025-04-12 RX ORDER — DEXAMETHASONE SODIUM PHOSPHATE 10 MG/ML
8 INJECTION, SOLUTION INTRA-ARTICULAR; INTRALESIONAL; INTRAMUSCULAR; INTRAVENOUS; SOFT TISSUE ONCE
Status: COMPLETED | OUTPATIENT
Start: 2025-04-12 | End: 2025-04-12

## 2025-04-12 RX ADMIN — DEXAMETHASONE SODIUM PHOSPHATE 8 MG: 10 INJECTION, SOLUTION INTRA-ARTICULAR; INTRALESIONAL; INTRAMUSCULAR; INTRAVENOUS; SOFT TISSUE at 12:07

## 2025-04-12 ASSESSMENT — FIBROSIS 4 INDEX: FIB4 SCORE: 0.97

## 2025-04-12 NOTE — LETTER
April 12, 2025         Patient: Nesha Mart   YOB: 1975   Date of Visit: 4/12/2025           To Whom it May Concern:    Nesha Mart was seen in my clinic on 4/12/2025. Please excuse her from work on the dates of 4/11 and 4/12 due to an acute illness.    If you have any questions or concerns, please don't hesitate to call.        Sincerely,           ETHEL Maddox.[email or web address].  Electronically Signed

## 2025-04-12 NOTE — PROGRESS NOTES
Subjective:   Nesha Mart is a 50 y.o. female who presents for Pharyngitis, Cough, Body Aches, Fever, Runny Nose, and Chills (Pt states she's had symptoms for three days./She's been losing her voice for a month and a half)       HPI  Patient is a pleasant 50 y.o. who presents for evaluation of 3-day history of sore throat, cough, myalgia, runny nose, fatigue, and chills.  Just has had approximately 1 month history of intermittent laryngitis.  States her significant other has been ill with similar symptoms.  Has taken Advil with intermittent relief.    ROS  All other systems are negative except as documented above within HPI.    MEDS:   Current Outpatient Medications:     acetaminophen-codeine #3 (TYLENOL #3) 300-30 MG Tab, Take 1-2 Tablets by mouth every four hours as needed., Disp: , Rfl:     diclofenac sodium (VOLTAREN) 1 % Gel, Apply 4 g topically 4 times a day as needed (pain)., Disp: 50 g, Rfl: 0    QUEtiapine (SEROQUEL) 25 MG Tab, Take 25 mg by mouth at bedtime., Disp: , Rfl:     propranolol CR (INDERAL LA) 80 MG CAPSULE SR 24 HR, Take 80 mg by mouth every day., Disp: , Rfl:     hydrOXYzine HCl (ATARAX) 25 MG Tab, TAKE 1-3 TABLETS BY MOUTH ONCE DAILY AT BEDTIME AS NEEDED FOR INSOMNIA AND ITCHING., Disp: , Rfl:     MAGNESIUM PO, Take 1 Tablet by mouth every day., Disp: , Rfl:     cyclobenzaprine (FLEXERIL) 10 mg Tab, Take 10 mg by mouth in the morning, at noon, and at bedtime., Disp: , Rfl:     meloxicam (MOBIC) 15 MG tablet, Take 15 mg by mouth every day., Disp: , Rfl:     ondansetron (ZOFRAN ODT) 4 MG TABLET DISPERSIBLE, Take 1 Tablet by mouth every 8 hours as needed for Nausea/Vomiting., Disp: 6 Tablet, Rfl: 0    gabapentin (NEURONTIN) 300 MG Cap, Take 300 mg by mouth at bedtime., Disp: , Rfl:     escitalopram (LEXAPRO) 20 MG tablet, Take 20 mg by mouth every day., Disp: , Rfl:     atenolol (TENORMIN) 50 MG Tab, , Disp: , Rfl:     furosemide (LASIX) 40 MG Tab, Take 1 Tablet by mouth every day. (Patient  "not taking: Reported on 4/12/2025), Disp: , Rfl:     methimazole (TAPAZOLE) 10 MG Tab, Take 3 Tablets by mouth every day., Disp: , Rfl:     lidocaine (LIDODERM) 5 % Patch, Place 1 Patch on the skin every 24 hours. (Patient not taking: Reported on 4/12/2025), Disp: 10 Patch, Rfl: 0    Saccharomyces boulardii (PROBIOTIC) 250 MG Cap, Take  by mouth., Disp: , Rfl:     Black Cohosh 540 MG Cap, Take 67,000 mg by mouth., Disp: , Rfl:   ALLERGIES:   Allergies   Allergen Reactions    Lyrica Palpitations     Pt states she \"felt weird\" and noticed elevated HR    Tylenol [Acetaminophen] Unspecified     Thyroid doctor advised Pt to not use Tylenol.       Patient's PMH, SocHx, SurgHx, FamHx, Drug allergies and medications were reviewed.     Objective:   /64   Pulse 78   Temp 36.7 °C (98 °F) (Temporal)   Resp 16   Ht 1.6 m (5' 3\")   Wt 79.2 kg (174 lb 9.6 oz)   SpO2 98%   BMI 30.93 kg/m²     Physical Exam  Vitals and nursing note reviewed.   Constitutional:       General: She is awake.      Appearance: Normal appearance. She is well-developed.   HENT:      Head: Normocephalic and atraumatic.      Right Ear: Tympanic membrane, ear canal and external ear normal.      Left Ear: Tympanic membrane, ear canal and external ear normal.      Nose: Nose normal. No nasal tenderness, mucosal edema, congestion or rhinorrhea.      Right Turbinates: Enlarged.      Left Turbinates: Enlarged.      Mouth/Throat:      Lips: Pink.      Mouth: Mucous membranes are moist.      Pharynx: Oropharynx is clear. Uvula midline. Posterior oropharyngeal erythema present.   Eyes:      Extraocular Movements: Extraocular movements intact.      Conjunctiva/sclera: Conjunctivae normal.   Cardiovascular:      Rate and Rhythm: Normal rate and regular rhythm.      Pulses: Normal pulses.      Heart sounds: Normal heart sounds.   Pulmonary:      Effort: Pulmonary effort is normal.      Breath sounds: Normal breath sounds.   Musculoskeletal:         General: " Normal range of motion.      Cervical back: Normal range of motion and neck supple.   Skin:     General: Skin is warm and dry.   Neurological:      General: No focal deficit present.      Mental Status: She is alert and oriented to person, place, and time.   Psychiatric:         Mood and Affect: Mood normal.         Behavior: Behavior normal. Behavior is cooperative.         Thought Content: Thought content normal.         Judgment: Judgment normal.         Assessment/Plan:   Assessment    1. Laryngitis  - dexamethasone (Decadron) injection (check route below) 8 mg    2. Myalgia    3. Rhinorrhea    4. Acute cough      Vital signs stable at today's acute urgent care visit Decadron given in clinic.  Patient denied any additional testing at this time.  Work note provided. Recommend over-the-counter cough and cold medication as needed for symptomatic relief.    Advised the patient to follow-up with the primary care provider if symptoms persist.  Red flags discussed and indications to immediately call 911 or present to the ED. All questions were encouraged and answered to the patient's satisfaction and understanding, and they agree to the plan of care.     This is an acute problem with uncertain prognosis, medication management and instructions as well as management options were provided.  I personally reviewed prior external notes and test results pertinent to today and independently reviewed and interpreted all diagnostics, to include POCT testing. Time spent evaluating this patient includes preparing for visit, counseling/education, exam, evaluation, obtaining history, and ordering lab/test/procedures.      Please note that this dictation was created using voice recognition software. I have made a reasonable attempt to correct obvious errors, but I expect that there are errors of grammar and possibly content that I did not discover before finalizing the note.

## 2025-07-02 DIAGNOSIS — Z00.6 CLINICAL TRIAL PARTICIPANT: ICD-10-CM

## 2025-07-07 ENCOUNTER — HOSPITAL ENCOUNTER (OUTPATIENT)
Dept: LAB | Facility: MEDICAL CENTER | Age: 50
End: 2025-07-07
Attending: FAMILY MEDICINE
Payer: COMMERCIAL

## 2025-07-07 DIAGNOSIS — Z00.6 CLINICAL TRIAL PARTICIPANT: ICD-10-CM

## 2025-07-18 LAB
APOB+LDLR+PCSK9 GENE MUT ANL BLD/T: NOT DETECTED
BRCA1+BRCA2 DEL+DUP + FULL MUT ANL BLD/T: NOT DETECTED
MLH1+MSH2+MSH6+PMS2 GN DEL+DUP+FUL M: NOT DETECTED

## 2025-07-21 ENCOUNTER — RESULTS FOLLOW-UP (OUTPATIENT)
Dept: MEDICAL GROUP | Facility: PHYSICIAN GROUP | Age: 50
End: 2025-07-21
Payer: COMMERCIAL

## 2025-08-08 ENCOUNTER — OFFICE VISIT (OUTPATIENT)
Dept: URGENT CARE | Facility: PHYSICIAN GROUP | Age: 50
End: 2025-08-08
Payer: COMMERCIAL

## 2025-08-08 ENCOUNTER — APPOINTMENT (OUTPATIENT)
Dept: RADIOLOGY | Facility: MEDICAL CENTER | Age: 50
End: 2025-08-08
Attending: EMERGENCY MEDICINE
Payer: COMMERCIAL

## 2025-08-08 ENCOUNTER — HOSPITAL ENCOUNTER (EMERGENCY)
Facility: MEDICAL CENTER | Age: 50
End: 2025-08-08
Attending: EMERGENCY MEDICINE
Payer: COMMERCIAL

## 2025-08-08 VITALS
RESPIRATION RATE: 12 BRPM | WEIGHT: 146.39 LBS | SYSTOLIC BLOOD PRESSURE: 96 MMHG | BODY MASS INDEX: 25.94 KG/M2 | DIASTOLIC BLOOD PRESSURE: 67 MMHG | HEIGHT: 63 IN | OXYGEN SATURATION: 97 % | TEMPERATURE: 98 F | HEART RATE: 82 BPM

## 2025-08-08 VITALS
SYSTOLIC BLOOD PRESSURE: 102 MMHG | BODY MASS INDEX: 25.98 KG/M2 | DIASTOLIC BLOOD PRESSURE: 60 MMHG | RESPIRATION RATE: 16 BRPM | TEMPERATURE: 97.8 F | HEART RATE: 78 BPM | WEIGHT: 146.6 LBS | HEIGHT: 63 IN | OXYGEN SATURATION: 98 %

## 2025-08-08 DIAGNOSIS — R10.31 ACUTE RIGHT LOWER QUADRANT PAIN: Primary | ICD-10-CM

## 2025-08-08 DIAGNOSIS — R63.4 WEIGHT LOSS: ICD-10-CM

## 2025-08-08 DIAGNOSIS — R10.31 RIGHT LOWER QUADRANT ABDOMINAL PAIN: Primary | ICD-10-CM

## 2025-08-08 DIAGNOSIS — R19.7 DIARRHEA, UNSPECIFIED TYPE: ICD-10-CM

## 2025-08-08 LAB
ALBUMIN SERPL BCP-MCNC: 4.7 G/DL (ref 3.2–4.9)
ALBUMIN/GLOB SERPL: 1.3 G/DL
ALP SERPL-CCNC: 77 U/L (ref 30–99)
ALT SERPL-CCNC: 15 U/L (ref 2–50)
ANION GAP SERPL CALC-SCNC: 14 MMOL/L (ref 7–16)
APPEARANCE UR: CLEAR
AST SERPL-CCNC: 22 U/L (ref 12–45)
BACTERIA #/AREA URNS HPF: ABNORMAL /HPF
BASOPHILS # BLD AUTO: 0.5 % (ref 0–1.8)
BASOPHILS # BLD: 0.03 K/UL (ref 0–0.12)
BILIRUB SERPL-MCNC: 0.4 MG/DL (ref 0.1–1.5)
BILIRUB UR QL STRIP.AUTO: NEGATIVE
BUN SERPL-MCNC: 26 MG/DL (ref 8–22)
CALCIUM ALBUM COR SERPL-MCNC: 9.3 MG/DL (ref 8.5–10.5)
CALCIUM SERPL-MCNC: 9.9 MG/DL (ref 8.5–10.5)
CASTS URNS QL MICRO: ABNORMAL /LPF (ref 0–2)
CHLORIDE SERPL-SCNC: 104 MMOL/L (ref 96–112)
CO2 SERPL-SCNC: 22 MMOL/L (ref 20–33)
COLOR UR: YELLOW
CREAT SERPL-MCNC: 0.84 MG/DL (ref 0.5–1.4)
EOSINOPHIL # BLD AUTO: 0.06 K/UL (ref 0–0.51)
EOSINOPHIL NFR BLD: 1.1 % (ref 0–6.9)
EPITHELIAL CELLS 1715: ABNORMAL /HPF (ref 0–5)
ERYTHROCYTE [DISTWIDTH] IN BLOOD BY AUTOMATED COUNT: 42.5 FL (ref 35.9–50)
GFR SERPLBLD CREATININE-BSD FMLA CKD-EPI: 84 ML/MIN/1.73 M 2
GLOBULIN SER CALC-MCNC: 3.5 G/DL (ref 1.9–3.5)
GLUCOSE SERPL-MCNC: 102 MG/DL (ref 65–99)
GLUCOSE UR STRIP.AUTO-MCNC: NEGATIVE MG/DL
HCG SERPL QL: NEGATIVE
HCT VFR BLD AUTO: 41.5 % (ref 37–47)
HGB BLD-MCNC: 13.6 G/DL (ref 12–16)
IMM GRANULOCYTES # BLD AUTO: 0.03 K/UL (ref 0–0.11)
IMM GRANULOCYTES NFR BLD AUTO: 0.5 % (ref 0–0.9)
KETONES UR STRIP.AUTO-MCNC: 15 MG/DL
LEUKOCYTE ESTERASE UR QL STRIP.AUTO: ABNORMAL
LIPASE SERPL-CCNC: 70 U/L (ref 11–82)
LYMPHOCYTES # BLD AUTO: 2.05 K/UL (ref 1–4.8)
LYMPHOCYTES NFR BLD: 36.6 % (ref 22–41)
MCH RBC QN AUTO: 29.4 PG (ref 27–33)
MCHC RBC AUTO-ENTMCNC: 32.8 G/DL (ref 32.2–35.5)
MCV RBC AUTO: 89.8 FL (ref 81.4–97.8)
MICRO URNS: ABNORMAL
MONOCYTES # BLD AUTO: 0.49 K/UL (ref 0–0.85)
MONOCYTES NFR BLD AUTO: 8.8 % (ref 0–13.4)
NEUTROPHILS # BLD AUTO: 2.94 K/UL (ref 1.82–7.42)
NEUTROPHILS NFR BLD: 52.5 % (ref 44–72)
NITRITE UR QL STRIP.AUTO: NEGATIVE
NRBC # BLD AUTO: 0 K/UL
NRBC BLD-RTO: 0 /100 WBC (ref 0–0.2)
PH UR STRIP.AUTO: 5.5 [PH] (ref 5–8)
PLATELET # BLD AUTO: 329 K/UL (ref 164–446)
PMV BLD AUTO: 9.3 FL (ref 9–12.9)
POTASSIUM SERPL-SCNC: 4 MMOL/L (ref 3.6–5.5)
PROT SERPL-MCNC: 8.2 G/DL (ref 6–8.2)
PROT UR QL STRIP: NEGATIVE MG/DL
RBC # BLD AUTO: 4.62 M/UL (ref 4.2–5.4)
RBC # URNS HPF: ABNORMAL /HPF
RBC UR QL AUTO: NEGATIVE
SODIUM SERPL-SCNC: 140 MMOL/L (ref 135–145)
SP GR UR STRIP.AUTO: 1.02
UROBILINOGEN UR STRIP.AUTO-MCNC: 1 EU/DL
WBC # BLD AUTO: 5.6 K/UL (ref 4.8–10.8)
WBC #/AREA URNS HPF: ABNORMAL /HPF

## 2025-08-08 PROCEDURE — 74177 CT ABD & PELVIS W/CONTRAST: CPT

## 2025-08-08 PROCEDURE — 99284 EMERGENCY DEPT VISIT MOD MDM: CPT

## 2025-08-08 PROCEDURE — 83690 ASSAY OF LIPASE: CPT

## 2025-08-08 PROCEDURE — 80053 COMPREHEN METABOLIC PANEL: CPT

## 2025-08-08 PROCEDURE — 99214 OFFICE O/P EST MOD 30 MIN: CPT

## 2025-08-08 PROCEDURE — 81001 URINALYSIS AUTO W/SCOPE: CPT

## 2025-08-08 PROCEDURE — 85025 COMPLETE CBC W/AUTO DIFF WBC: CPT

## 2025-08-08 PROCEDURE — 700117 HCHG RX CONTRAST REV CODE 255: Performed by: EMERGENCY MEDICINE

## 2025-08-08 PROCEDURE — 36415 COLL VENOUS BLD VENIPUNCTURE: CPT

## 2025-08-08 PROCEDURE — 84703 CHORIONIC GONADOTROPIN ASSAY: CPT

## 2025-08-08 RX ORDER — SODIUM, POTASSIUM,MAG SULFATES 17.5-3.13G
SOLUTION, RECONSTITUTED, ORAL ORAL
COMMUNITY
End: 2025-08-08

## 2025-08-08 RX ORDER — DULOXETIN HYDROCHLORIDE 20 MG/1
CAPSULE, DELAYED RELEASE ORAL
COMMUNITY
Start: 2025-08-05

## 2025-08-08 RX ORDER — METHOCARBAMOL 500 MG/1
TABLET, FILM COATED ORAL
COMMUNITY
Start: 2025-07-07 | End: 2025-08-08

## 2025-08-08 RX ORDER — LACTULOSE 10 G/15ML
SOLUTION ORAL
COMMUNITY
Start: 2025-02-11 | End: 2025-08-08

## 2025-08-08 RX ORDER — DULOXETIN HYDROCHLORIDE 30 MG/1
CAPSULE, DELAYED RELEASE ORAL
COMMUNITY
Start: 2025-07-07 | End: 2025-08-08

## 2025-08-08 RX ORDER — SUMATRIPTAN SUCCINATE 25 MG/1
TABLET ORAL
COMMUNITY
End: 2025-08-08

## 2025-08-08 RX ORDER — TRAMADOL HYDROCHLORIDE 50 MG/1
50 TABLET ORAL
COMMUNITY
Start: 2025-01-06 | End: 2025-08-08

## 2025-08-08 RX ORDER — FLUTICASONE PROPIONATE 50 MCG
SPRAY, SUSPENSION (ML) NASAL
COMMUNITY
Start: 2025-04-11 | End: 2025-08-08

## 2025-08-08 RX ADMIN — IOHEXOL 100 ML: 350 INJECTION, SOLUTION INTRAVENOUS at 20:45

## 2025-08-08 ASSESSMENT — ENCOUNTER SYMPTOMS
FEVER: 1
WEIGHT LOSS: 1
MYALGIAS: 1
ABDOMINAL PAIN: 1
SHORTNESS OF BREATH: 0
DIARRHEA: 1
CHILLS: 0
NAUSEA: 0
HEADACHES: 0
SORE THROAT: 0
VOMITING: 0
COUGH: 0

## 2025-08-08 ASSESSMENT — FIBROSIS 4 INDEX
FIB4 SCORE: 1.03
FIB4 SCORE: 1.03